# Patient Record
Sex: FEMALE | URBAN - METROPOLITAN AREA
[De-identification: names, ages, dates, MRNs, and addresses within clinical notes are randomized per-mention and may not be internally consistent; named-entity substitution may affect disease eponyms.]

---

## 2024-08-20 ENCOUNTER — LAB REQUISITION (OUTPATIENT)
Dept: LAB | Facility: HOSPITAL | Age: 24
End: 2024-08-20

## 2024-08-20 PROCEDURE — 86481 TB AG RESPONSE T-CELL SUSP: CPT

## 2024-08-22 ENCOUNTER — LAB (OUTPATIENT)
Dept: LAB | Facility: LAB | Age: 24
End: 2024-08-22

## 2024-08-22 DIAGNOSIS — Z11.1 ENCOUNTER FOR SCREENING FOR RESPIRATORY TUBERCULOSIS: Primary | ICD-10-CM

## 2024-08-22 PROCEDURE — 86481 TB AG RESPONSE T-CELL SUSP: CPT

## 2024-08-22 PROCEDURE — 36415 COLL VENOUS BLD VENIPUNCTURE: CPT

## 2024-08-24 LAB
NIL(NEG) CONTROL SPOT COUNT: NORMAL
PANEL A SPOT COUNT: 0
PANEL B SPOT COUNT: 0
POS CONTROL SPOT COUNT: NORMAL
T-SPOT. TB INTERPRETATION: NEGATIVE

## 2024-09-29 ENCOUNTER — ANCILLARY PROCEDURE (OUTPATIENT)
Dept: URGENT CARE | Age: 24
End: 2024-09-29
Payer: COMMERCIAL

## 2024-09-29 ENCOUNTER — OFFICE VISIT (OUTPATIENT)
Dept: URGENT CARE | Age: 24
End: 2024-09-29
Payer: COMMERCIAL

## 2024-09-29 VITALS
WEIGHT: 145 LBS | SYSTOLIC BLOOD PRESSURE: 120 MMHG | HEART RATE: 94 BPM | DIASTOLIC BLOOD PRESSURE: 83 MMHG | TEMPERATURE: 98.2 F | OXYGEN SATURATION: 99 %

## 2024-09-29 DIAGNOSIS — S49.91XA RIGHT SHOULDER INJURY, INITIAL ENCOUNTER: Primary | ICD-10-CM

## 2024-09-29 DIAGNOSIS — S49.91XA RIGHT SHOULDER INJURY, INITIAL ENCOUNTER: ICD-10-CM

## 2024-09-29 PROCEDURE — 73030 X-RAY EXAM OF SHOULDER: CPT | Mod: RIGHT SIDE | Performed by: PHYSICIAN ASSISTANT

## 2024-09-29 PROCEDURE — 1036F TOBACCO NON-USER: CPT | Performed by: PHYSICIAN ASSISTANT

## 2024-09-29 PROCEDURE — 99203 OFFICE O/P NEW LOW 30 MIN: CPT | Performed by: PHYSICIAN ASSISTANT

## 2024-09-29 RX ORDER — METHOCARBAMOL 500 MG/1
TABLET, FILM COATED ORAL
Qty: 16 TABLET | Refills: 0 | Status: SHIPPED | OUTPATIENT
Start: 2024-09-29

## 2024-09-29 NOTE — PROGRESS NOTES
Subjective   Patient ID: Adry Angel is a 24 y.o. female. They present today with a chief complaint of Shoulder Pain (Right shoulder popped out of place last night Tingly sensation in hand ).    History of Present Illness  HPI  Patient presents to the urgent care is a 24-year-old female with resolved right shoulder pain that began last night when she fully extended her shoulder. Patient states she stretched her right arm up against a cabinet while she squatted down to pick something up.  Patient states she felt a sharp pain, like her shoulder rotated forward out of place.  Patient states she was unable to change her position at that time, required her to support her right arm with her other arm.  Patient states as her  was driving her to the ER, her shoulder went back into place, and her pain stopped.  Patient reports while it felt out of place she had tingling in her fingers 3 through 5.  Patient denies any tingling, or changes in sensation in her right arm at this time.  Patient reports a history of possible shoulder subluxation in 2020, patient was seen at an urgent care, no treatment was required.  Patient states since the injury occurred she has never been able to fully extend her shoulder due to fear of it moving out of place again.    Past Medical History  Allergies as of 09/29/2024    (No Known Allergies)       (Not in a hospital admission)       No past medical history on file.    No past surgical history on file.     reports that she has never smoked. She has never used smokeless tobacco. She reports that she does not drink alcohol and does not use drugs.    Review of Systems  Review of Systems     Patient denies numbness, weakness, sore throat, fever, rash, dizziness, shortness of breath, increased urinary frequency, chills, abdominal pain, chest pain.                          Objective    Vitals:    09/29/24 1056   BP: 120/83   Pulse: 94   Temp: 36.8 °C (98.2 °F)   SpO2: 99%   Weight: 65.8 kg  "(145 lb)     Patient's last menstrual period was 08/29/2024.    Physical Exam  Appearance: Alert, oriented, cooperative, in no acute distress. Well nourished & well hydrated.    Skin: No petechiae or purpura.     Eyes: Conjunctiva pink with no redness or exudates. Eyelids without lesions. No scleral icterus.     ENT: Hearing grossly intact. External inspection of ears without lesions or erythema. no nasal flaring.    Pulmonary:  Good chest wall excursion. No accessory muscle use or stridor.    Cardiac:  No JVD.     Musculoskeletal: no deformity. No cyanosis, clubbing. sensation 5/5 in UE b/l, cap. fill < 2 sec  in UE b/l, FROM neck, declined shoulder ROM, strength 5/5 of UE, equal hand , no erythema or edema, no tenderness to palpation of musculature of mid posterior shoulder, no palpable spasms, no biceps tenderness, mild acromioclavicular joint and anterior shoulder tenderness to firm palpation. no spinal or paraspinal tenderness, neurovascularly intact.     Neurological: no focal findings identified.    Psychiatric: Appropriate mood and affect.  Procedures    Point of Care Test & Imaging Results from this visit  No results found for this visit on 09/29/24.   XR shoulder right 2+ views    Result Date: 9/29/2024  Interpreted By:  Monty Engle, STUDY: XR SHOULDER RIGHT 2+ VIEWS; ;  9/29/2024 12:24 pm   INDICATION: Signs/Symptoms:injury, \"feels like it rolled forward out of place\".   ,S49.91XA Unspecified injury of right shoulder and upper arm, initial encounter   COMPARISON: None.   ACCESSION NUMBER(S): [id number]   ORDERING CLINICIAN: JU WARD   FINDINGS: No evidence for acute fracture or dislocation. No bone lesions or cortical erosions. No radiopaque foreign bodies.       No plain film evidence for displaced acute fracture or dislocation.     MACRO: None   Signed by: Monty Engle 9/29/2024 12:40 PM Dictation workstation:   [id number]     Diagnostic study results (if any) were reviewed by Ju JAIMES" MARIE Abrams.    Assessment/Plan   Allergies, medications, history, and pertinent labs/EKGs/Imaging reviewed by Shaunna Abrams PA-C.     Medical Decision Making  Considerations for patient's symptoms include cervical/shoulder strain, disc herniation, impingement syndrome, osteoarthritis, adhesive capsulitis, cervical spondylosis, rotator cuff tendinitis, subluxation, glenohumeral instability or subacromial pain syndrome. Patient denies any symptoms at this time, but declined ROM exam.   Preliminary review of x-ray no acute fractures or dislocations  I discussed radiologist interpretation with patient.  Patient will be given referral for orthopedics. Patient is asymptomatic at this time, if symptoms return patient will take muscle relaxant methocarbamol and reach out to her orthopedic physician and/or primary care.  Patient advised to follow-up with orthopedics and or primary care if no improvement. Patient verbalizes understanding and agrees with plan of care.  All questions were answered.     Dictation software was used in the creation of this note which does not evaluate or correct for typographical, spelling, syntax or grammatical errors.     Orders and Diagnoses  Diagnoses and all orders for this visit:  Right shoulder injury, initial encounter  -     XR shoulder right 2+ views; Future  -     Referral to Orthopaedic Surgery; Future  -     methocarbamol (Robaxin) 500 mg tablet; Take 1 tab PO 4 times daily as needed for right shoulder pain.      Medical Admin Record      Patient disposition: Home    Electronically signed by Shaunna Abrams PA-C  12:51 PM

## 2024-09-29 NOTE — PATIENT INSTRUCTIONS
Home Care:   1. Rest the injured area and keep it elevated (above heart level) as much as possible. In bed, rest your arm or leg on pillows to keep it elevated.   2. Using cool packs on the first day might help to reduce swelling.   3. You might give acetaminophen (paracetamol, Tylenol) for pain.   4. Some recommend giving ibuprofen (Motrin, Advil), but this can cause increased bruising and bleeding in an injury.   5. If you have an elastic bandage, splint, or sling, adjust your bandage, splint, or sling if it becomes too tight or if it causes swelling.   6. Follow up with Orthopedics for further evaluation or earlier if symptoms are worse.     Call your doctor or go to the emergency department if worse or:   1. Pain increases.   2. Numbness or tingling occurs.   3. Swelling increases.   4. Nail or skin color (pale, bluish, or deeper in color than the other side) changes.   5. No improvement in 1-2 weeks

## 2024-10-04 ENCOUNTER — OFFICE VISIT (OUTPATIENT)
Dept: ORTHOPEDIC SURGERY | Facility: HOSPITAL | Age: 24
End: 2024-10-04
Payer: COMMERCIAL

## 2024-10-04 DIAGNOSIS — S49.91XA RIGHT SHOULDER INJURY, INITIAL ENCOUNTER: ICD-10-CM

## 2024-10-04 DIAGNOSIS — S43.431A GLENOID LABRUM TEAR, RIGHT, INITIAL ENCOUNTER: ICD-10-CM

## 2024-10-04 DIAGNOSIS — S43.431A ANTERIOR TO POSTERIOR TEAR OF SUPERIOR GLENOID LABRUM OF RIGHT SHOULDER: ICD-10-CM

## 2024-10-04 DIAGNOSIS — S43.014A CLOSED TRAUMATIC ANTERIOR DISLOCATION OF RIGHT GLENOHUMERAL JOINT: ICD-10-CM

## 2024-10-04 PROCEDURE — 99214 OFFICE O/P EST MOD 30 MIN: CPT | Performed by: STUDENT IN AN ORGANIZED HEALTH CARE EDUCATION/TRAINING PROGRAM

## 2024-10-04 PROCEDURE — 1036F TOBACCO NON-USER: CPT | Performed by: STUDENT IN AN ORGANIZED HEALTH CARE EDUCATION/TRAINING PROGRAM

## 2024-10-04 PROCEDURE — 99204 OFFICE O/P NEW MOD 45 MIN: CPT | Performed by: STUDENT IN AN ORGANIZED HEALTH CARE EDUCATION/TRAINING PROGRAM

## 2024-10-04 NOTE — PROGRESS NOTES
PRIMARY CARE PHYSICIAN: No primary care provider on file.  REFERRING PROVIDER: Shaunna Abrams PA-C  [address]  [facility], [address] [place] [id number]     CONSULT ORTHOPAEDIC: Shoulder Evaluation    ASSESSMENT & PLAN    Impression: 24 y.o. female with right shoulder anterior instability concerning for anterior-inferior glenoid labral tear.    Plan:   I explained to the patient the nature of their diagnosis.  I reviewed their imaging studies with them.    Based on the history, physical exam and imaging studies above, the patient's presentation is consistent with the above diagnosis.  I had a long discussion with the patient regarding their presentation and the treatment options.  We discussed initial nonoperative versus operative management options as well as potential further diagnostic imaging.  Diagnostic and treatment options going forward.  She had an anterior dislocation event with residual pain and dysfunction of the shoulder since.  I recommend that we proceed with an MRI of the right shoulder to evaluate for soft tissue injury that may require surgical intervention prior to proceeding with any further nonoperative management.    Follow-Up: Patient will follow-up after the MRI is completed to review the imaging studies and discuss a treatment plan going forward    At the end of the visit, all questions were answered in full. The patient is in agreement with the plan and recommendations. They will call the office with any questions/concerns.    Note dictated with NUV[employer] software. Completed without full typed error editing and sent to avoid delay.       SUBJECTIVE  CHIEF COMPLAINT:   Chief Complaint   Patient presents with    Right Shoulder - Pain        HPI: Adry Angel is a 24 y.o. patient who presents today with Right shoulder pain and dysfunction after an acute anterior instability event. Patient reports that she has a chronic history of right shoulder  pain and instability. Patient reports that her shoulder recently dislocated as she reached above her head.  After waiting a period of time, she reports that her shoulder reduced on its own; she reports that this was fairly traumatic I reviewed the patient's x-ray findings with her.  We discussed her. Pain is described as achy. She reports history of clicking in the anterior shoulder. She reports a prior history of shoulder subluxation. Patient is not taking any medication to help with pain.     They deny any associated neck pain.  No numbness, tingling, or paresthesias.    REVIEW OF SYSTEMS  Constitutional: See HPI for pain assessment, No significant weight loss, recent trauma  Cardiovascular: No chest pain, shortness of breath  Respiratory: No difficulty breathing, cough  Gastrointestinal: No nausea, vomiting, diarrhea, constipation  Musculoskeletal: Noted in HPI, positive for pain, restricted motion, stiffness  Integumentary: No rashes, easy bruising, redness   Neurological: no numbness or tingling in extremities, no gait disturbances   Psychiatric: No mood changes, memory changes, social issues  Heme/Lymph: no excessive swelling, easy bruising, excessive bleeding  ENT: No hearing changes  Eyes: No vision changes    No past medical history on file.     No Known Allergies     No past surgical history on file.     No family history on file.     Social History     Socioeconomic History    Marital status:      Spouse name: Not on file    Number of children: Not on file    Years of education: Not on file    Highest education level: Not on file   Occupational History    Not on file   Tobacco Use    Smoking status: Never    Smokeless tobacco: Never   Substance and Sexual Activity    Alcohol use: Never    Drug use: Never    Sexual activity: Not on file   Other Topics Concern    Not on file   Social History Narrative    Not on file     Social Determinants of Health     Financial Resource Strain: Not on file   Food  Insecurity: Not on file   Transportation Needs: Not on file   Physical Activity: Not on file   Stress: Not on file   Social Connections: Not on file   Intimate Partner Violence: Not on file   Housing Stability: Not on file        CURRENT MEDICATIONS:   Current Outpatient Medications   Medication Sig Dispense Refill    methocarbamol (Robaxin) 500 mg tablet Take 1 tab PO 4 times daily as needed for right shoulder pain. 16 tablet 0     No current facility-administered medications for this visit.        OBJECTIVE    PHYSICAL EXAM      9/29/2024    10:56 AM   Vitals   Systolic 120   Diastolic 83   Heart Rate 94   Temp 36.8 °C (98.2 °F)   Weight (lb) 145   Visit Report Report      There is no height or weight on file to calculate BMI.    GENERAL: A/Ox3, NAD. Appears healthy, well nourished  PSYCHIATRIC: Mood stable, appropriate memory recall  EYES: EOM intact, no scleral icterus  CARDIOVASCULAR: Palpable peripheral pulses  LUNGS: Breathing non-labored on room air  SKIN: no erythema, rashes, or ecchymosis     MUSCULOSKELETAL:  Laterality: right Shoulder Exam  - Negative Spurlings, full painless neck ROM  - Skin intact  - No erythema or warmth  - No ecchymosis or soft tissue swelling  - Shoulder ROM: Forward flexion 140 with apprehension, ER 30, IR mid lumbar  - Strength:      Jobes 5-/5     ER 5-/5     Belly press and bearhug 5-/5  - Palpation: Positive tenderness biceps groove and anterior joint line  - Ugarte and Neers positive  - Speeds and O'Briens positive  - Stability: Anterior/Posterior load and shift 2+ anterior 0 posterior, positive apprehension and relocation test    NEUROVASCULAR:  - Neurovascular Status: sensation intact to light touch distally, upper extremity motor grossly intact  - Capillary refill brisk at extremities, Bilateral peripheral pulses 2+    Imaging: Multiple views of the affected right shoulder(s) demonstrate: No significant osseous normality, no fracture, no significant degenerative change.    X-rays were personally reviewed and interpreted by me.  Radiology reports were reviewed by me as well, if readily available at the time.      Les Brown MD  Attending Surgeon    Sports Medicine Orthopaedic Surgery  [facility]

## 2024-10-21 ENCOUNTER — APPOINTMENT (OUTPATIENT)
Dept: RADIOLOGY | Facility: HOSPITAL | Age: 24
End: 2024-10-21
Payer: COMMERCIAL

## 2024-10-25 ENCOUNTER — TELEPHONE (OUTPATIENT)
Dept: ORTHOPEDIC SURGERY | Facility: HOSPITAL | Age: 24
End: 2024-10-25
Payer: COMMERCIAL

## 2024-11-01 ENCOUNTER — APPOINTMENT (OUTPATIENT)
Dept: ORTHOPEDIC SURGERY | Facility: HOSPITAL | Age: 24
End: 2024-11-01
Payer: COMMERCIAL

## 2024-11-01 ENCOUNTER — TELEPHONE (OUTPATIENT)
Dept: ORTHOPEDIC SURGERY | Facility: CLINIC | Age: 24
End: 2024-11-01
Payer: COMMERCIAL

## 2024-11-01 NOTE — TELEPHONE ENCOUNTER
Beto decided to pay out of pocket for the MRI. She going to have it performed at [facility] in [place]. She's going to notify our office once its completed to be scheduled for the results.

## 2024-11-05 DIAGNOSIS — S43.431A GLENOID LABRUM TEAR, RIGHT, INITIAL ENCOUNTER: ICD-10-CM

## 2024-11-05 DIAGNOSIS — S43.431A ANTERIOR TO POSTERIOR TEAR OF SUPERIOR GLENOID LABRUM OF RIGHT SHOULDER: ICD-10-CM

## 2024-11-15 ENCOUNTER — OFFICE VISIT (OUTPATIENT)
Dept: ORTHOPEDIC SURGERY | Facility: HOSPITAL | Age: 24
End: 2024-11-15
Payer: COMMERCIAL

## 2024-11-15 ENCOUNTER — TELEPHONE (OUTPATIENT)
Dept: ORTHOPEDIC SURGERY | Facility: HOSPITAL | Age: 24
End: 2024-11-15

## 2024-11-15 VITALS — WEIGHT: 150 LBS | BODY MASS INDEX: 24.99 KG/M2 | HEIGHT: 65 IN

## 2024-11-15 DIAGNOSIS — M25.311 INSTABILITY OF RIGHT SHOULDER JOINT: Primary | ICD-10-CM

## 2024-11-15 DIAGNOSIS — M65.911 SYNOVITIS OF RIGHT SHOULDER: ICD-10-CM

## 2024-11-15 DIAGNOSIS — S43.431A GLENOID LABRUM TEAR, RIGHT, INITIAL ENCOUNTER: ICD-10-CM

## 2024-11-15 PROCEDURE — 99214 OFFICE O/P EST MOD 30 MIN: CPT | Performed by: STUDENT IN AN ORGANIZED HEALTH CARE EDUCATION/TRAINING PROGRAM

## 2024-11-15 PROCEDURE — 3008F BODY MASS INDEX DOCD: CPT | Performed by: STUDENT IN AN ORGANIZED HEALTH CARE EDUCATION/TRAINING PROGRAM

## 2024-11-15 PROCEDURE — 1036F TOBACCO NON-USER: CPT | Performed by: STUDENT IN AN ORGANIZED HEALTH CARE EDUCATION/TRAINING PROGRAM

## 2024-11-15 RX ORDER — ESCITALOPRAM OXALATE 10 MG/1
1 TABLET ORAL
COMMUNITY
Start: 2024-09-10

## 2024-11-15 RX ORDER — ETONOGESTREL AND ETHINYL ESTRADIOL .12; .015 MG/D; MG/D
RING VAGINAL
COMMUNITY
Start: 2024-10-18

## 2024-11-15 ASSESSMENT — PAIN - FUNCTIONAL ASSESSMENT: PAIN_FUNCTIONAL_ASSESSMENT: 0-10

## 2024-11-15 ASSESSMENT — PAIN SCALES - GENERAL: PAINLEVEL_OUTOF10: 4

## 2024-11-15 NOTE — PROGRESS NOTES
PRIMARY CARE PHYSICIAN: No primary care provider on file.  REFERRING PROVIDER: No referring provider defined for this encounter.     CONSULT ORTHOPAEDIC: Shoulder Evaluation    ASSESSMENT & PLAN    Impression: 24 y.o. female with right shoulder anterior instability with anterior-inferior glenoid labral tear.    Plan:   I explained to the patient the nature of their diagnosis.  I reviewed their imaging studies with them.    Based on the history, physical exam and imaging studies above, the patient's presentation is consistent with the above diagnosis.  I had a long discussion with the patient regarding their presentation and the treatment options.  We discussed initial nonoperative versus operative management options as well as potential further diagnostic imaging.  I reviewed her MRI findings with her.  She had an anterior dislocation event with residual pain and dysfunction of the shoulder since.  MRI demonstrates findings consistent with an anterior dislocation of the glenohumeral joint with an anterior-inferior glenoid labrum tear, no significant glenoid bone loss, Hill-Sachs bony contusion with an intact rotator cuff.  I reviewed these MRI findings with her.  She continues to have a sensation of instability and apprehension despite initial nonoperative management.  After long discussion with the patient, she elected to proceed with surgical intervention in the form of a right shoulder arthroscopy, anterior stabilization, labral repair, capsulorrhaphy, intra-articular debridement and synovectomy.  I thoroughly explained the risks and benefits as well as the expected postoperative timeline for the proposed procedure versus nonoperative management. Risks of this procedure include but are not limited to bleeding, infection, nerve injury, DVT and failure of repair or implant.  The patient expressed understanding and wished to proceed with surgical intervention.  All questions were answered. We will begin the  presurgical process and find them a surgical date in a timely fashion that works for them.    The patient will require an abduction sling for postoperative joint stability. Additionally, in order to decrease the amount of narcotic pain medication usage and improve their pain control and swelling, I recommend a cryotherapy machine.    At the end of the visit, all questions were answered in full. The patient is in agreement with the plan and recommendations. They will call the office with any questions/concerns.    Note dictated with [employer] software. Completed without full typed error editing and sent to avoid delay.       SUBJECTIVE  CHIEF COMPLAINT: Right shoulder anterior shoulder dislocation/instability    HPI: Adry Angel is a 24 y.o. patient who presents today with Right shoulder pain and dysfunction after an acute anterior instability event. She continues to experience right shoulder instability with significant apprehension. No new injury to the shoulder since her last visit. MRI completed at [facility].     She is here today to review MRI results.     See below for last office visit note:    Patient reports that she has a chronic history of right shoulder pain and instability. Patient reports that her shoulder recently dislocated as she reached above her head.  After waiting a period of time, she reports that her shoulder reduced on its own; she reports that this was fairly traumatic I reviewed the patient's x-ray findings with her.  We discussed her. Pain is described as achy. She reports history of clicking in the anterior shoulder. She reports a prior history of shoulder subluxation. Patient is not taking any medication to help with pain.     They deny any associated neck pain.  No numbness, tingling, or paresthesias.    REVIEW OF SYSTEMS  Constitutional: See HPI for pain assessment, No significant weight loss, recent trauma  Cardiovascular: No chest pain, shortness of  breath  Respiratory: No difficulty breathing, cough  Gastrointestinal: No nausea, vomiting, diarrhea, constipation  Musculoskeletal: Noted in HPI, positive for pain, restricted motion, stiffness  Integumentary: No rashes, easy bruising, redness   Neurological: no numbness or tingling in extremities, no gait disturbances   Psychiatric: No mood changes, memory changes, social issues  Heme/Lymph: no excessive swelling, easy bruising, excessive bleeding  ENT: No hearing changes  Eyes: No vision changes    No past medical history on file.     No Known Allergies     No past surgical history on file.     No family history on file.     Social History     Socioeconomic History    Marital status:      Spouse name: Not on file    Number of children: Not on file    Years of education: Not on file    Highest education level: Not on file   Occupational History    Not on file   Tobacco Use    Smoking status: Never    Smokeless tobacco: Never   Substance and Sexual Activity    Alcohol use: Never    Drug use: Never    Sexual activity: Not on file   Other Topics Concern    Not on file   Social History Narrative    Not on file     Social Drivers of Health     Financial Resource Strain: Not on file   Food Insecurity: Not on file   Transportation Needs: Not on file   Physical Activity: Not on file   Stress: Not on file   Social Connections: Not on file   Intimate Partner Violence: Not on file   Housing Stability: Not on file        CURRENT MEDICATIONS:   Current Outpatient Medications   Medication Sig Dispense Refill    methocarbamol (Robaxin) 500 mg tablet Take 1 tab PO 4 times daily as needed for right shoulder pain. 16 tablet 0     No current facility-administered medications for this visit.        OBJECTIVE    PHYSICAL EXAM      9/29/2024    10:56 AM   Vitals   Systolic 120   Diastolic 83   Heart Rate 94   Temp 36.8 °C (98.2 °F)   Weight (lb) 145   Visit Report Report      There is no height or weight on file to calculate  BMI.    GENERAL: A/Ox3, NAD. Appears healthy, well nourished  PSYCHIATRIC: Mood stable, appropriate memory recall  EYES: EOM intact, no scleral icterus  CARDIOVASCULAR: Palpable peripheral pulses  LUNGS: Breathing non-labored on room air  SKIN: no erythema, rashes, or ecchymosis     MUSCULOSKELETAL:  Laterality: right Shoulder Exam  - Negative Spurlings, full painless neck ROM  - Skin intact  - No erythema or warmth  - No ecchymosis or soft tissue swelling  - Shoulder ROM: Forward flexion 140 with apprehension, ER 30, IR mid lumbar  - Strength:      Jobes 5-/5     ER 5-/5     Belly press and bearhug 5-/5  - Palpation: Positive tenderness biceps groove and anterior joint line  - Ugarte and Neers positive  - Speeds and O'Briens positive  - Stability: Anterior/Posterior load and shift 2+ anterior 0 posterior, positive apprehension and relocation test    NEUROVASCULAR:  - Neurovascular Status: sensation intact to light touch distally, upper extremity motor grossly intact  - Capillary refill brisk at extremities, Bilateral peripheral pulses 2+    Imaging: MRI of the right shoulder reviewed by me demonstrates findings consistent with an anterior dislocation event with anterior-inferior glenoid labrum tear, no evidence of glenoid bone loss, Hill-Sachs bony contusion, intact rotator cuff, glenohumeral synovitis.  Images were personally reviewed and interpreted by me.  Radiology reports were reviewed by me as well, if readily available at the time.      Les Brown MD  Attending Surgeon    Sports Medicine Orthopaedic Surgery  [facility] [place]  [facility]

## 2024-11-15 NOTE — H&P (VIEW-ONLY)
PRIMARY CARE PHYSICIAN: No primary care provider on file.  REFERRING PROVIDER: No referring provider defined for this encounter.     CONSULT ORTHOPAEDIC: Shoulder Evaluation    ASSESSMENT & PLAN    Impression: 24 y.o. female with right shoulder anterior instability with anterior-inferior glenoid labral tear.    Plan:   I explained to the patient the nature of their diagnosis.  I reviewed their imaging studies with them.    Based on the history, physical exam and imaging studies above, the patient's presentation is consistent with the above diagnosis.  I had a long discussion with the patient regarding their presentation and the treatment options.  We discussed initial nonoperative versus operative management options as well as potential further diagnostic imaging.  I reviewed her MRI findings with her.  She had an anterior dislocation event with residual pain and dysfunction of the shoulder since.  MRI demonstrates findings consistent with an anterior dislocation of the glenohumeral joint with an anterior-inferior glenoid labrum tear, no significant glenoid bone loss, Hill-Sachs bony contusion with an intact rotator cuff.  I reviewed these MRI findings with her.  She continues to have a sensation of instability and apprehension despite initial nonoperative management.  After long discussion with the patient, she elected to proceed with surgical intervention in the form of a right shoulder arthroscopy, anterior stabilization, labral repair, capsulorrhaphy, intra-articular debridement and synovectomy.  I thoroughly explained the risks and benefits as well as the expected postoperative timeline for the proposed procedure versus nonoperative management. Risks of this procedure include but are not limited to bleeding, infection, nerve injury, DVT and failure of repair or implant.  The patient expressed understanding and wished to proceed with surgical intervention.  All questions were answered. We will begin the  presurgical process and find them a surgical date in a timely fashion that works for them.    The patient will require an abduction sling for postoperative joint stability. Additionally, in order to decrease the amount of narcotic pain medication usage and improve their pain control and swelling, I recommend a cryotherapy machine.    At the end of the visit, all questions were answered in full. The patient is in agreement with the plan and recommendations. They will call the office with any questions/concerns.    Note dictated with gBox software. Completed without full typed error editing and sent to avoid delay.       SUBJECTIVE  CHIEF COMPLAINT: Right shoulder anterior shoulder dislocation/instability    HPI: Adry Angel is a 24 y.o. patient who presents today with Right shoulder pain and dysfunction after an acute anterior instability event. She continues to experience right shoulder instability with significant apprehension. No new injury to the shoulder since her last visit. MRI completed at [facility].     She is here today to review MRI results.     See below for last office visit note:    Patient reports that she has a chronic history of right shoulder pain and instability. Patient reports that her shoulder recently dislocated as she reached above her head.  After waiting a period of time, she reports that her shoulder reduced on its own; she reports that this was fairly traumatic I reviewed the patient's x-ray findings with her.  We discussed her. Pain is described as achy. She reports history of clicking in the anterior shoulder. She reports a prior history of shoulder subluxation. Patient is not taking any medication to help with pain.     They deny any associated neck pain.  No numbness, tingling, or paresthesias.    REVIEW OF SYSTEMS  Constitutional: See HPI for pain assessment, No significant weight loss, recent trauma  Cardiovascular: No chest pain, shortness of  breath  Respiratory: No difficulty breathing, cough  Gastrointestinal: No nausea, vomiting, diarrhea, constipation  Musculoskeletal: Noted in HPI, positive for pain, restricted motion, stiffness  Integumentary: No rashes, easy bruising, redness   Neurological: no numbness or tingling in extremities, no gait disturbances   Psychiatric: No mood changes, memory changes, social issues  Heme/Lymph: no excessive swelling, easy bruising, excessive bleeding  ENT: No hearing changes  Eyes: No vision changes    No past medical history on file.     No Known Allergies     No past surgical history on file.     No family history on file.     Social History     Socioeconomic History    Marital status:      Spouse name: Not on file    Number of children: Not on file    Years of education: Not on file    Highest education level: Not on file   Occupational History    Not on file   Tobacco Use    Smoking status: Never    Smokeless tobacco: Never   Substance and Sexual Activity    Alcohol use: Never    Drug use: Never    Sexual activity: Not on file   Other Topics Concern    Not on file   Social History Narrative    Not on file     Social Drivers of Health     Financial Resource Strain: Not on file   Food Insecurity: Not on file   Transportation Needs: Not on file   Physical Activity: Not on file   Stress: Not on file   Social Connections: Not on file   Intimate Partner Violence: Not on file   Housing Stability: Not on file        CURRENT MEDICATIONS:   Current Outpatient Medications   Medication Sig Dispense Refill    methocarbamol (Robaxin) 500 mg tablet Take 1 tab PO 4 times daily as needed for right shoulder pain. 16 tablet 0     No current facility-administered medications for this visit.        OBJECTIVE    PHYSICAL EXAM      9/29/2024    10:56 AM   Vitals   Systolic 120   Diastolic 83   Heart Rate 94   Temp 36.8 °C (98.2 °F)   Weight (lb) 145   Visit Report Report      There is no height or weight on file to calculate  BMI.    GENERAL: A/Ox3, NAD. Appears healthy, well nourished  PSYCHIATRIC: Mood stable, appropriate memory recall  EYES: EOM intact, no scleral icterus  CARDIOVASCULAR: Palpable peripheral pulses  LUNGS: Breathing non-labored on room air  SKIN: no erythema, rashes, or ecchymosis     MUSCULOSKELETAL:  Laterality: right Shoulder Exam  - Negative Spurlings, full painless neck ROM  - Skin intact  - No erythema or warmth  - No ecchymosis or soft tissue swelling  - Shoulder ROM: Forward flexion 140 with apprehension, ER 30, IR mid lumbar  - Strength:      Jobes 5-/5     ER 5-/5     Belly press and bearhug 5-/5  - Palpation: Positive tenderness biceps groove and anterior joint line  - Ugarte and Neers positive  - Speeds and O'Briens positive  - Stability: Anterior/Posterior load and shift 2+ anterior 0 posterior, positive apprehension and relocation test    NEUROVASCULAR:  - Neurovascular Status: sensation intact to light touch distally, upper extremity motor grossly intact  - Capillary refill brisk at extremities, Bilateral peripheral pulses 2+    Imaging: MRI of the right shoulder reviewed by me demonstrates findings consistent with an anterior dislocation event with anterior-inferior glenoid labrum tear, no evidence of glenoid bone loss, Hill-Sachs bony contusion, intact rotator cuff, glenohumeral synovitis.  Images were personally reviewed and interpreted by me.  Radiology reports were reviewed by me as well, if readily available at the time.      Les Brown MD  Attending Surgeon    Sports Medicine Orthopaedic Surgery  [facility] [place]  [facility] [employer]

## 2024-11-15 NOTE — TELEPHONE ENCOUNTER
[address] [facility] [address] [phone] Patient: Kendall Roach MRN: [id number] EWC:[id number] Visit Information Date & Time Provider Department Dept. Phone Encounter #  
 12/11/2017  9:45 AM Quiana Harkins MD [facility] [phone] [id number] Follow-up Instructions Return in about 6 months (around 6/11/2018). Upcoming Health Maintenance Date Due Hepatitis C Screening 1950 FOOT EXAM Q1 3/20/1962 DTaP/Tdap/Td series (1 - Tdap) 3/20/1973 BREAST CANCER SCRN MAMMOGRAM 3/20/2002 FOBT Q 1 YEAR AGE 50-75 3/20/2002 ZOSTER VACCINE AGE 60> 1/20/2012 OSTEOPOROSIS SCREENING (DEXA) 3/20/2017 Pneumococcal 65+ Low/Medium Risk (1 of 2 - PCV13) 3/20/2017 MEDICARE YEARLY EXAM 3/20/2017 Influenza Age 5 to Adult 8/1/2017 EYE EXAM RETINAL OR DILATED Q1 12/5/2017 HEMOGLOBIN A1C Q6M 6/4/2018 MICROALBUMIN Q1 12/4/2018 LIPID PANEL Q1 12/4/2018 GLAUCOMA SCREENING Q2Y 12/5/2018 Allergies as of 12/11/2017  Review Complete On: 12/11/2017 By: Quiana Harkins MD  
 No Known Allergies Current Immunizations  Never Reviewed No immunizations on file. Not reviewed this visit You Were Diagnosed With   
  
 Codes Comments Uncontrolled type 2 diabetes mellitus with stage 2 chronic kidney disease, without long-term current use of insulin (HCC)    -  Primary ICD-10-CM: E11.22, E11.65, N18.2 ICD-9-CM: 250.52, 585.2 Mixed hyperlipidemia     ICD-10-CM: E78.2 ICD-9-CM: 272.2 Essential hypertension     ICD-10-CM: I10 
ICD-9-CM: 401.9 Vitals BP Pulse Temp Resp Height(growth percentile) Weight(growth percentile) 151/78 (BP 1 Location: Left arm, BP Patient Position: Sitting) 82 96.2 °F (35.7 °C) (Oral) 16 5' 1\" (1.549 m) 216 lb 6.4 oz (98.2 kg) SpO2 BMI OB Status Smoking Status 98% 40.89 kg/m2 Unknown Never Smoker Please submit case request for R jose on 11/27/24   BMI and BSA Data Body Mass Index Body Surface Area  
 40.89 kg/m 2 2.06 m 2 Preferred Pharmacy Pharmacy Name Phone [facility], [address] Jennifer Lopez [phone] Your Updated Medication List  
  
   
This list is accurate as of: 12/11/17 10:31 AM.  Always use your most recent med list. amLODIPine 10 mg tablet Commonly known as:  Emily Pace TAKE 1 TABLET BY MOUTH DAILY  
  
 aspirin 81 mg tablet Take 81 mg by mouth. * atorvastatin 40 mg tablet Commonly known as:  LIPITOR Take 1 Tab by mouth nightly. Stop 20 mg dose * atorvastatin 20 mg tablet Commonly known as:  LIPITOR  
TAKE 1 TABLET BY MOUTH EVERY NIGHT  
  
 COREG CR 40 mg CR capsule Generic drug:  carvedilol TAKE 1 CAPSULE BY MOUTH EVERY DAY WITH BREAKFAST  
  
 glucose blood VI test strips strip Commonly known as:  ONE TOUCH TEST  
100 Each by Does Not Apply route two (2) times a day. Dx. Code 250.02  
  
 loratadine 10 mg tablet Commonly known as:  Faith Ashley Take 10 mg by mouth.  
  
 metFORMIN 500 mg tablet Commonly known as:  GLUCOPHAGE  
TAKE 1 TABLET BY MOUTH TWICE DAILY WITH MEAL  
  
 sAXagliptin 5 mg Tab tablet Commonly known as:  ONGLYZA Take 1 Tab by mouth daily. Stop tradjenta * Notice: This list has 2 medication(s) that are the same as other medications prescribed for you. Read the directions carefully, and ask your doctor or other care provider to review them with you. Prescriptions Printed Refills sAXagliptin (ONGLYZA) 5 mg tab tablet 6 Sig: Take 1 Tab by mouth daily. Stop tradjenta Class: Print Route: Oral  
  
Follow-up Instructions Return in about 6 months (around 6/11/2018). Patient Instructions Increase to lipitor  40 mg at night STOP tradjenta 5 mg a day Start on onglyza 5 mg a day Metformin 500 mg twice a day with meals STOP norvasc 10 mg a day , and  STOP  coreg cr 40 mg  
Start on  Diltiazem  mg  once a day Introducing [facility]! Dear Tyrese De La Torre: Thank you for requesting a [employer] account. Our records indicate that you already have an active [employer] account. You can access your account anytime at [email or web address] Did you know that you can access your hospital and ER discharge instructions at any time in [employer]? You can also review all of your test results from your hospital stay or ER visit. Additional Information If you have questions, please visit the Frequently Asked Questions section of the [employer] website at [email or web address] Remember, [employer] is NOT to be used for urgent needs. For medical emergencies, dial 911. Now available from your iPhone and Android! Please provide this summary of care documentation to your next provider. Your primary care clinician is listed as Harry Levy. If you have any questions after today's visit, please call [phone].

## 2024-11-17 PROBLEM — M25.311 INSTABILITY OF RIGHT SHOULDER JOINT: Status: ACTIVE | Noted: 2024-11-15

## 2024-11-17 PROBLEM — S43.431A GLENOID LABRUM TEAR, RIGHT, INITIAL ENCOUNTER: Status: ACTIVE | Noted: 2024-11-15

## 2024-11-17 PROBLEM — M65.911 SYNOVITIS OF RIGHT SHOULDER: Status: ACTIVE | Noted: 2024-11-15

## 2024-11-18 ENCOUNTER — CLINICAL SUPPORT (OUTPATIENT)
Dept: PREADMISSION TESTING | Facility: HOSPITAL | Age: 24
End: 2024-11-18
Payer: COMMERCIAL

## 2024-11-18 VITALS — HEIGHT: 65 IN | BODY MASS INDEX: 24.99 KG/M2 | WEIGHT: 150 LBS

## 2024-11-18 DIAGNOSIS — S43.431A GLENOID LABRUM TEAR, RIGHT, INITIAL ENCOUNTER: ICD-10-CM

## 2024-11-18 ASSESSMENT — ENCOUNTER SYMPTOMS
FEVER: 0
CHILLS: 0
WEAKNESS: 0
COUGH: 0
WOUND: 0
VOMITING: 0
BRUISES/BLEEDS EASILY: 0
NAUSEA: 0
DYSPNEA AT REST: 0
TREMORS: 0
SHORTNESS OF BREATH: 0

## 2024-11-18 NOTE — PREPROCEDURE INSTRUCTIONS
Current Medications   Medication Instructions    EluRyng 0.12-0.015 mg/24 hr vaginal ring As directed    escitalopram (Lexapro) 10 mg tablet Take evening prior to surgery                       NPO Instructions:    Do not eat any food after midnight the night before your surgery/procedure.    Additional Instructions:     Seven/Six Days before Surgery:  Review your medication instructions, stop indicated medications  Five Days before Surgery:  Review your medication instructions, stop indicated medications  Three Days before Surgery:  Review your medication instructions, stop indicated medications  The Day before Surgery:  Review your medication instructions, stop indicated medications  You will be contacted regarding the time of your arrival to facility and surgery time  Do not eat any food after Midnight  Day of Surgery:  Review your medication instructions, take indicated medications  Wear  comfortable loose fitting clothing  Do not use moisturizers, creams, lotions or perfume  All jewelry and valuables should be left at home

## 2024-11-18 NOTE — CPM/PAT NURSE NOTE
CPM/PAT Nurse Note      Name: Adry Angel (Adry Angel)  /Age:  y.o.       Past Medical History:   Diagnosis Date    Anxiety     Depression     Urinary tract infection        Past Surgical History:   Procedure Laterality Date    NO PAST SURGERIES         Patient  has no history on file for sexual activity.    No family history on file.    No Known Allergies    Prior to Admission medications    Medication Sig Start Date End Date Taking? Authorizing Provider   EluRyng 0.12-0.015 mg/24 hr vaginal ring PLEASE SEE ATTACHED FOR DETAILED DIRECTIONS 10/18/24  Yes Historical Provider, MD   escitalopram (Lexapro) 10 mg tablet Take 1 tablet (10 mg) by mouth early in the morning.. 9/10/24  Yes Historical Provider, MD   methocarbamol (Robaxin) 500 mg tablet Take 1 tab PO 4 times daily as needed for right shoulder pain. 24   MARIE Espinoza ROS:   Constitutional:    no fever   no chills  Neuro/Psych:    no weakness   no tremors  Eyes:   Ears:    no hearing aides  Nose:   Mouth:   Throat:    no throat pain  Neck:   Cardio:    no chest pain   no dyspnea  Respiratory:    no cough   no shortness of breath  Endocrine:   GI:    no nausea   no vomiting  :   Musculoskeletal:   Hematologic:    does not bruise/bleed easily   no history of blood transfusion  Skin:   no sores/wound   no rash      DASI Risk Score      Flowsheet Row Questionnaire Series Submission from 2024 in [facility] Care with Generic Provider Dodie   Can you take care of yourself (eat, dress, bathe, or use toilet)?  2.75  filed at 2024 2319   Can you walk indoors, such as around your house? 1.75  filed at 2024 2319   Can you walk a block or two on level ground?  2.75  filed at 2024 2319   Can you climb a flight of stairs or walk up a hill? 5.5  filed at [address]   Can you run a short distance? 8  filed at [address]   Can you do light work around the house like dusting or washing dishes? 2.7   filed at 11/17/2024 2319   Can you do moderate work around the house like vacuuming, sweeping floors or carrying groceries? 3.5  filed at 11/17/2024 2319   Can you do heavy work around the house like scrubbing floors or lifting and moving heavy furniture?  8  filed at 11/17/2024 2319   Can you do yard work like raking leaves, weeding or pushing a mower? 4.5  filed at 11/17/2024 2319   Can you have sexual relations? 5.25  filed at 11/17/2024 2319   Can you participate in moderate recreational activities like golf, bowling, dancing, doubles tennis or throwing a baseball or football? 6  filed at 11/17/2024 2319   Can you participate in strenous sports like swimming, singles tennis, football, basketball, or skiing? 7.5  filed at 11/17/2024 2319   DASI SCORE 58.2  filed at 11/17/2024 2319   METS Score (Will be calculated only when all the questions are answered) 9.9  filed at 11/17/2024 2319          Caprini DVT Assessment    No data to display       Modified Frailty Index    No data to display       CHADS2 Stroke Risk  Current as of about an hour ago        N/A 3 to 100%: High Risk   2 to < 3%: Medium Risk   0 to < 2%: Low Risk     Last Change: N/A          This score determines the patient's risk of having a stroke if the patient has atrial fibrillation.        This score is not applicable to this patient. Components are not calculated.          Revised Cardiac Risk Index    No data to display       Apfel Simplified Score    No data to display       Risk Analysis Index Results This Encounter    No data found in the last 10 encounters.       Stop Bang Score      Flowsheet Row Clinical Support from 11/18/2024 in [facility] Questionnaire Series Submission from 11/17/2024 in [facility] Care with Generic Provider Dodie   Do you snore loudly? 0 filed at 11/18/2024 1457 0  filed at 11/17/2024 2319   Do you often feel tired or fatigued after your sleep? 0 filed at 11/18/2024 1457 0  filed at 11/17/2024 2319    Has anyone ever observed you stop breathing in your sleep? 0 filed at 11/18/2024 1457 0  filed at 11/17/2024 2319   Do you have or are you being treated for high blood pressure? 0 filed at 11/18/2024 1457 0  filed at 11/17/2024 2319   Recent BMI (Calculated) 25 filed at 11/18/2024 1457 25  filed at 11/17/2024 2319   Is BMI greater than 35 kg/m2? 0=No filed at 11/18/2024 1457 0=No  filed at 11/17/2024 2319   Age older than 50 years old? 0=No filed at 11/18/2024 1457 0=No  filed at 11/17/2024 2319   Gender - Male 0=No filed at 11/18/2024 1457 0=No  filed at 11/17/2024 2319          Prodigy: High Risk  Total Score: 0          ARISCAT Score for Postoperative Pulmonary Complications    No data to display       Saeed Perioperative Risk for Myocardial Infarction or Cardiac Arrest (JOSE)    No data to display         Nurse Plan of Action: Spoke with patient and completed RN PAT screening call. Discussed preoperative surgical instructions and education. Chart updated per information provided by patient. Patient had no further questions at this time. In person PAT appointment not requested for patient per patient history.

## 2024-11-21 ENCOUNTER — APPOINTMENT (OUTPATIENT)
Dept: PREADMISSION TESTING | Facility: HOSPITAL | Age: 24
End: 2024-11-21
Payer: COMMERCIAL

## 2024-11-22 NOTE — DISCHARGE INSTRUCTIONS
Les Brown M.D.   Sports Medicine Orthopaedic Surgery    [facility]  [address] [facility]                  [address] [facility]       [address]  [facility], [address]   Phone: [phone]         Phone: [phone]       Phone: [phone]   Fax: [phone]                         Fax: [phone]       Fax: [phone]     After hours phone number: [phone]    AFTER SURGERY     Anesthesia  If you received a nerve block during surgery, you may have numbness or inability to move the limb. Do not be alarmed as this may last 8-36 hours depending upon the amount and type of medication used by the anesthesiologist. Make sure If you are experiencing numbness after 36 hours, please call the office. When the nerve block begins to wear off, you will feel a tingling sensation, like pins and needles. It is important that you start taking the pain medication at that time to ensure that you “stay ahead of the pain.” It is important to take the pain medicine when the pain level is a 4 or 5/10, before it gets too high.    Do not operate heavy machinery, make major decisions, drink alcohol or smoke for 24 hours. Do not drink alcohol while taking pain medications.    Prescribed Medications   Narcotic pain medicine (Oxycodone): The goal of post-operative pain management is pain control, NOT pain elimination. You should expect some pain after surgery - this pain helps you protect itself while it is healing. Constipation, nausea, itching, and drowsiness are side effects of this type of medication. You should take an over-the-counter stool softener (Colace and/or Senna) while taking narcotics to prevent constipation. If you experience itching, over the counter Benadryl may be helpful. Narcotic pain medications often produce drowsiness and it is against the law to operate a vehicle  while taking these medications. If you are taking oxycodone, you should take acetaminophen (Tylenol) around the clock to decrease baseline pain. Do not take Tylenol-containing products while on Percocet or [place].   Refill Policy: For concerns over your safety due to the rising opioid addiction epidemic in the [place], refills of your narcotic pain medications will only be provided on a case by case basis. Please use these medications judiciously.    Anti-inflammatory (NSAID) medicine (Naproxen or Mobic): These are both anti-inflammatory and pain relief. Do NOT take this medication if you have had an ulcer in the past unless you have cleared this with your primary care doctor. You should take NSAIDs with food or antacid to reduce the chance of upset stomach. Depending on your surgery, Dr. Brown may instruct you to avoid these medications.    Anti-nausea medicine (ondansetron/Zofran): sometimes patients experience nausea related to either anesthesia or the narcotic pain medication. If this is the case you will find this medication helpful.    DVT prophylaxis (Aspirin or Eliquis): For most patients, activity alone is sufficient to prevent dangerous blood clots, but in some cases your personal risk profile and/or the type of surgery you have undergone makes it necessary that you take medication to help prevent blood clots. Dr. Brown will inform you if you are to start one of these medications postoperatively.    Stool softener (Colace and/or Senna): are available over the counter at your local pharmacy and should be taken while you are taking narcotic pain medication to avoid constipation. You should stop taking these medications if you develop diarrhea. Over the counter laxatives may be used if you develop painful constipation.     Diet   Start with clear liquids (water, juice, Gatorade) and light foods (jello, soup, crackers). Progress to normal diet as tolerated if you are not nauseated. Avoid greasy or spicy  foods for the first 24hrs to avoid GI upset. Increase fluid intake to help prevent constipation.    Dressings / Wound Care  You may remove the outer dressing after 3 days and then can shower. (If you have a splint, please leave the splint in place until follow-up.) Do not remove Steri-strips (white stickers) if present over your incisions. Steri-strips may fall off on their own, which is normal. After the bandage has been removed, you may leave the incisions open to air. Alternatively, if you prefer to keep them covered, you may do so with Band-Aids, a light gauze dressing, or a clean ACE wrap. You may shower after the bandage has been removed (3 days), but it is very important that you pat the wounds dry after the shower. It is OK to allow soap and water to run over the wound - DO NOT soak or scrub the wound. Outside of the shower, keep your incision clean and dry until your first postoperative visit, approximately 10-14 days after surgery. You may remove your sling or brace to shower, unless otherwise instructed. As your balance may be affected by recent surgery, we recommend placing a plastic chair in the shower to help prevent falls. Do NOT take baths, go into a pool, or soak the operative site until approved by Dr. Brown.    Bracing / Physical Therapy   If you were given one, make sure you wear sling or brace at all times until your follow-up with Dr. Brown. Only remove your sling or brace for physical therapy, home exercises, and hygiene. These are typically used for 4-6 weeks after surgery in order to protect the healing of the tissue.     Physical therapy is just as important to your recovery as the actual operation! If you were given a prescription for physical therapy, make sure you go to your appointments and do your exercises daily at home (especially motion exercises).     Ice is a very important part of your recovery. It helps reduce inflammation and improves pain control. You should ice a few times  each day for 20-30 minutes at a time. Please make sure there is something under the ice (clean towel, cloth, T-shirt) so that the ice doesn't directly contact your skin. If you ordered a commercially available ice machine (optional) and a compression setting is available, you should use LOW or no compression during the first 5 days. After that, you may increase compression setting as tolerated. If the compression is bothering you then do not use compression.    Driving / Travel  Ultimately, it is your judgment to decide when you are safe to drive, but if you are at all unsure, do not risk your life or someone else's. As a general guideline, you will not be able to drive for 4-6 weeks after surgery. You should certainly not drive while on narcotics pain medication or while in a brace or sling.     Avoid flights and long distance traveling for 6 weeks after surgery. It is important to discuss your travel plans with Dr. Brown, as additional medications may need to be prescribed to help prevent blood clots if certain travel is unavoidable.     Return to Work or School   Your return to work will depend on what surgery was done and what type of work you do. Please note that these are general guidelines, and there may be modifications based on your unique situation. Typically, you may return to sedentary work or school 3-7 days after surgery if pain is tolerable and you are no longer requiring narcotic pain medication. In conjunction with your input, Dr. Brown will determine when you may return to more physically rigorous demands.     If you had Knee or Hip Surgery   If your surgery involves a ligament reconstruction or tendon repair, you will typically be prescribed crutches for at least the first few days until pain and the postoperative protocol allows you to fully bear weight and also wear a brace for 4-6 weeks. If cartilage surgery or meniscus repair is performed, you may be on crutches for 6 weeks. Individual  rehabilitation guidelines will vary based upon the unique situation and surgery of every patient, but take these general guidelines into account when planning return to work.     If you had Shoulder Surgery   If your surgery involves a repair (rotator cuff repair, labral repair), you will have a sling on for six weeks after surgery. If you have a sling, you will need to wear it all day. Please wear the sling at all times except for bathing for the first 2 weeks minimum. As long as you can abide by the restrictions, you can return to work when you feel like you can do so safely. However, you will need to take into consideration driving and activities related to your job. You may be able to safely loosen it if you are able to keep your arm supported. Please understand that you will NOT be able to work with your arm away from your body, above shoulder level, or use your arm against gravity for approximately 8 weeks. For jobs that require physical labor, you may require four months or more to return to work. If your surgery does NOT involve a repair (subacromial decompression, distal clavicle excision, capsular release), then you will be in a sling for only a few days after surgery. When comfortable, you may return to work when ready to conduct normal activities of your job. Remember that you may be on narcotic pain medications and these should be discontinued prior to your return to work. For jobs that require physical labor, you may require 6 weeks or more to return to work.     If you had Elbow or Wrist Surgery   If your surgery involves a repair or reconstruction, you will have a splint and sling on followed by a brace for four to six weeks after surgery. As long as you can abide by the restrictions, you can return to work when you feel like you can do so safely. However, you will need to take into consideration driving and activities related to your job. If you have a sling, you will need to wear it all day unless  otherwise instructed. You may be able to safely loosen it if you are able to keep your arm supported. For jobs that require physical labor, you may require four months or more to return to work.    If you had Ankle Surgery   If your surgery involves a repair or reconstruction, you will have a splint followed by a walking boot for four to six weeks after surgery. As long as you can abide by the restrictions, you can return to work when you feel like you can do so safely. However, you will need to take into consideration driving and activities related to your job. For jobs that require physical labor, you may require four months or more to return to work.    Normal Sensations and Findings after Surgery:   PAIN: We do everything possible to make your pain/discomfort level tolerable, but some amount of pain is to be expected.   WARMTH: Mild warmth around the operative site is normal for up to 3 weeks.   REDNESS: Small amount of redness where the sutures enter the skin is normal. If redness worsens or spreads it is important that you contact the office.   DRAINAGE: A small amount is normal for the first 48-72 hours. If wounds continue to drain after this time (requiring multiple gauze changes per day), please contact the office.   NUMBNESS: Around the incision is common.   BRUISING: Is common and often tracks down the arm or leg due to gravity and results in an alarming appearance, but is common and will resolve with time.   FEVER: Low-grade fevers (less than 101.5°F) are common during the first week after surgery. You should drink plenty of fluids and breathe deeply.   CONSTIPATION: Post-operative pain medications as well as immobility can lead to constipation. Please consider taking an over the counter stool softener such as Colace and/or Senna if you experience constipation or if you have a history of constipation.    Follow-Up   A Follow-up appointment should be arranged for 10-14 days after surgery. If one has not  been provided, please call the office to schedule.       NOTIFY US IMMEDIATELY FOR ANY OF THE FOLLOWING:   Most orthopedic surgical procedures are uneventful. However, complications can occur. The following are things to be aware of in the immediate postoperative period.   FEVER - Temperature rises above 101.5°F or associated chills/sweats   WOUND - If you notice drainage more than 4 days after surgery, if the drainage turns yellows and foul smelling, if you need to change gauze multiple times per day, or if sutures become loose.   CARDIOVASCULAR - Chest pain, shortness of breath, palpitations, or fainting spells must be taken seriously. Go to the emergency room (or call 911) immediately for evaluation.   BLOOD CLOTS - Orthopaedic surgery patients are at risk for blood clots. While the risk is higher for lower extremity surgery, even those who have undergone upper extremity surgery are at an increased risk. Please notify Dr. Brown if you or someone in your family has had blood clots or any type of known clotting disorder. Signs of blood clots may include calf pain or cramping, diffuse swelling in the leg and foot, or chest pain and shortness of breath. Please call the office or go to the hospital if you recognize any of these symptoms.   NAUSEA - If you have severe vomiting, diarrhea, or constipation, or cannot keep any liquid down   URINARY RETENTION - If you cannot urinate the night after surgery, please go to the Emergency Room.

## 2024-11-27 ENCOUNTER — ANESTHESIA EVENT (OUTPATIENT)
Dept: OPERATING ROOM | Facility: HOSPITAL | Age: 24
End: 2024-11-27
Payer: COMMERCIAL

## 2024-11-27 ENCOUNTER — ANESTHESIA (OUTPATIENT)
Dept: OPERATING ROOM | Facility: HOSPITAL | Age: 24
End: 2024-11-27
Payer: COMMERCIAL

## 2024-11-27 ENCOUNTER — HOSPITAL ENCOUNTER (OUTPATIENT)
Facility: HOSPITAL | Age: 24
Setting detail: OUTPATIENT SURGERY
Discharge: HOME | End: 2024-11-27
Attending: STUDENT IN AN ORGANIZED HEALTH CARE EDUCATION/TRAINING PROGRAM | Admitting: STUDENT IN AN ORGANIZED HEALTH CARE EDUCATION/TRAINING PROGRAM
Payer: COMMERCIAL

## 2024-11-27 VITALS
HEIGHT: 65 IN | TEMPERATURE: 98.1 F | HEART RATE: 91 BPM | OXYGEN SATURATION: 94 % | BODY MASS INDEX: 26.08 KG/M2 | DIASTOLIC BLOOD PRESSURE: 85 MMHG | WEIGHT: 156.53 LBS | RESPIRATION RATE: 16 BRPM | SYSTOLIC BLOOD PRESSURE: 139 MMHG

## 2024-11-27 DIAGNOSIS — S43.431A GLENOID LABRUM TEAR, RIGHT, INITIAL ENCOUNTER: ICD-10-CM

## 2024-11-27 DIAGNOSIS — M25.311 INSTABILITY OF RIGHT SHOULDER JOINT: ICD-10-CM

## 2024-11-27 DIAGNOSIS — M65.911 SYNOVITIS OF RIGHT SHOULDER: Primary | ICD-10-CM

## 2024-11-27 PROBLEM — F41.9 ANXIETY: Status: ACTIVE | Noted: 2024-11-27

## 2024-11-27 PROBLEM — F32.A DEPRESSION: Status: ACTIVE | Noted: 2024-11-27

## 2024-11-27 LAB — HCG UR QL IA.RAPID: NEGATIVE

## 2024-11-27 PROCEDURE — 2720000007 HC OR 272 NO HCPCS: Performed by: STUDENT IN AN ORGANIZED HEALTH CARE EDUCATION/TRAINING PROGRAM

## 2024-11-27 PROCEDURE — 29806 SHO ARTHRS SRG CAPSULORRAPHY: CPT | Performed by: STUDENT IN AN ORGANIZED HEALTH CARE EDUCATION/TRAINING PROGRAM

## 2024-11-27 PROCEDURE — 2500000004 HC RX 250 GENERAL PHARMACY W/ HCPCS (ALT 636 FOR OP/ED)

## 2024-11-27 PROCEDURE — 2780000003 HC OR 278 NO HCPCS: Performed by: STUDENT IN AN ORGANIZED HEALTH CARE EDUCATION/TRAINING PROGRAM

## 2024-11-27 PROCEDURE — 7100000009 HC PHASE TWO TIME - INITIAL BASE CHARGE: Performed by: STUDENT IN AN ORGANIZED HEALTH CARE EDUCATION/TRAINING PROGRAM

## 2024-11-27 PROCEDURE — 2500000004 HC RX 250 GENERAL PHARMACY W/ HCPCS (ALT 636 FOR OP/ED): Performed by: NURSE ANESTHETIST, CERTIFIED REGISTERED

## 2024-11-27 PROCEDURE — 3600000009 HC OR TIME - EACH INCREMENTAL 1 MINUTE - PROCEDURE LEVEL FOUR: Performed by: STUDENT IN AN ORGANIZED HEALTH CARE EDUCATION/TRAINING PROGRAM

## 2024-11-27 PROCEDURE — 81025 URINE PREGNANCY TEST: CPT

## 2024-11-27 PROCEDURE — 2500000001 HC RX 250 WO HCPCS SELF ADMINISTERED DRUGS (ALT 637 FOR MEDICARE OP)

## 2024-11-27 PROCEDURE — 2500000005 HC RX 250 GENERAL PHARMACY W/O HCPCS

## 2024-11-27 PROCEDURE — 3600000004 HC OR TIME - INITIAL BASE CHARGE - PROCEDURE LEVEL FOUR: Performed by: STUDENT IN AN ORGANIZED HEALTH CARE EDUCATION/TRAINING PROGRAM

## 2024-11-27 PROCEDURE — 7100000002 HC RECOVERY ROOM TIME - EACH INCREMENTAL 1 MINUTE: Performed by: STUDENT IN AN ORGANIZED HEALTH CARE EDUCATION/TRAINING PROGRAM

## 2024-11-27 PROCEDURE — 2500000004 HC RX 250 GENERAL PHARMACY W/ HCPCS (ALT 636 FOR OP/ED): Mod: JZ

## 2024-11-27 PROCEDURE — 7100000010 HC PHASE TWO TIME - EACH INCREMENTAL 1 MINUTE: Performed by: STUDENT IN AN ORGANIZED HEALTH CARE EDUCATION/TRAINING PROGRAM

## 2024-11-27 PROCEDURE — 29823 SHO ARTHRS SRG XTNSV DBRDMT: CPT

## 2024-11-27 PROCEDURE — 29823 SHO ARTHRS SRG XTNSV DBRDMT: CPT | Performed by: STUDENT IN AN ORGANIZED HEALTH CARE EDUCATION/TRAINING PROGRAM

## 2024-11-27 PROCEDURE — 3700000001 HC GENERAL ANESTHESIA TIME - INITIAL BASE CHARGE: Performed by: STUDENT IN AN ORGANIZED HEALTH CARE EDUCATION/TRAINING PROGRAM

## 2024-11-27 PROCEDURE — 7100000001 HC RECOVERY ROOM TIME - INITIAL BASE CHARGE: Performed by: STUDENT IN AN ORGANIZED HEALTH CARE EDUCATION/TRAINING PROGRAM

## 2024-11-27 PROCEDURE — 2500000004 HC RX 250 GENERAL PHARMACY W/ HCPCS (ALT 636 FOR OP/ED): Performed by: ANESTHESIOLOGY

## 2024-11-27 PROCEDURE — C1713 ANCHOR/SCREW BN/BN,TIS/BN: HCPCS | Performed by: STUDENT IN AN ORGANIZED HEALTH CARE EDUCATION/TRAINING PROGRAM

## 2024-11-27 PROCEDURE — 2500000004 HC RX 250 GENERAL PHARMACY W/ HCPCS (ALT 636 FOR OP/ED): Performed by: STUDENT IN AN ORGANIZED HEALTH CARE EDUCATION/TRAINING PROGRAM

## 2024-11-27 PROCEDURE — 2500000005 HC RX 250 GENERAL PHARMACY W/O HCPCS: Performed by: STUDENT IN AN ORGANIZED HEALTH CARE EDUCATION/TRAINING PROGRAM

## 2024-11-27 PROCEDURE — 3700000002 HC GENERAL ANESTHESIA TIME - EACH INCREMENTAL 1 MINUTE: Performed by: STUDENT IN AN ORGANIZED HEALTH CARE EDUCATION/TRAINING PROGRAM

## 2024-11-27 PROCEDURE — A4649 SURGICAL SUPPLIES: HCPCS | Performed by: STUDENT IN AN ORGANIZED HEALTH CARE EDUCATION/TRAINING PROGRAM

## 2024-11-27 DEVICE — IMPLANTABLE DEVICE: Type: IMPLANTABLE DEVICE | Site: SHOULDER | Status: FUNCTIONAL

## 2024-11-27 RX ORDER — ONDANSETRON HYDROCHLORIDE 2 MG/ML
INJECTION, SOLUTION INTRAVENOUS AS NEEDED
Status: DISCONTINUED | OUTPATIENT
Start: 2024-11-27 | End: 2024-11-27

## 2024-11-27 RX ORDER — CELECOXIB 200 MG/1
200 CAPSULE ORAL ONCE
Status: COMPLETED | OUTPATIENT
Start: 2024-11-27 | End: 2024-11-27

## 2024-11-27 RX ORDER — FENTANYL CITRATE 50 UG/ML
25 INJECTION, SOLUTION INTRAMUSCULAR; INTRAVENOUS EVERY 5 MIN PRN
Status: DISCONTINUED | OUTPATIENT
Start: 2024-11-27 | End: 2024-11-27 | Stop reason: HOSPADM

## 2024-11-27 RX ORDER — OXYCODONE HYDROCHLORIDE 5 MG/1
5 TABLET ORAL EVERY 4 HOURS PRN
Qty: 15 TABLET | Refills: 0 | Status: SHIPPED | OUTPATIENT
Start: 2024-11-27 | End: 2024-11-30

## 2024-11-27 RX ORDER — LIDOCAINE HYDROCHLORIDE 10 MG/ML
INJECTION, SOLUTION EPIDURAL; INFILTRATION; INTRACAUDAL; PERINEURAL AS NEEDED
Status: DISCONTINUED | OUTPATIENT
Start: 2024-11-27 | End: 2024-11-27

## 2024-11-27 RX ORDER — ASPIRIN 81 MG/1
81 TABLET ORAL 2 TIMES DAILY
Qty: 30 TABLET | Refills: 0 | Status: SHIPPED | OUTPATIENT
Start: 2024-11-27 | End: 2024-12-12

## 2024-11-27 RX ORDER — ONDANSETRON HYDROCHLORIDE 2 MG/ML
4 INJECTION, SOLUTION INTRAVENOUS ONCE AS NEEDED
Status: DISCONTINUED | OUTPATIENT
Start: 2024-11-27 | End: 2024-11-27 | Stop reason: HOSPADM

## 2024-11-27 RX ORDER — ONDANSETRON 4 MG/1
4 TABLET, FILM COATED ORAL EVERY 8 HOURS PRN
Qty: 20 TABLET | Refills: 0 | Status: SHIPPED | OUTPATIENT
Start: 2024-11-27 | End: 2024-12-03 | Stop reason: ALTCHOICE

## 2024-11-27 RX ORDER — OXYCODONE HYDROCHLORIDE 5 MG/1
5 TABLET ORAL EVERY 4 HOURS PRN
Status: DISCONTINUED | OUTPATIENT
Start: 2024-11-27 | End: 2024-11-27 | Stop reason: HOSPADM

## 2024-11-27 RX ORDER — ACETAMINOPHEN 500 MG
1000 TABLET ORAL EVERY 8 HOURS PRN
Qty: 60 TABLET | Refills: 1 | Status: SHIPPED | OUTPATIENT
Start: 2024-11-27 | End: 2024-12-03 | Stop reason: ALTCHOICE

## 2024-11-27 RX ORDER — FENTANYL CITRATE 50 UG/ML
100 INJECTION, SOLUTION INTRAMUSCULAR; INTRAVENOUS ONCE
Status: COMPLETED | OUTPATIENT
Start: 2024-11-27 | End: 2024-11-27

## 2024-11-27 RX ORDER — PROPOFOL 10 MG/ML
INJECTION, EMULSION INTRAVENOUS AS NEEDED
Status: DISCONTINUED | OUTPATIENT
Start: 2024-11-27 | End: 2024-11-27

## 2024-11-27 RX ORDER — FENTANYL CITRATE 50 UG/ML
INJECTION, SOLUTION INTRAMUSCULAR; INTRAVENOUS AS NEEDED
Status: DISCONTINUED | OUTPATIENT
Start: 2024-11-27 | End: 2024-11-27

## 2024-11-27 RX ORDER — LIDOCAINE HYDROCHLORIDE 10 MG/ML
0.1 INJECTION, SOLUTION EPIDURAL; INFILTRATION; INTRACAUDAL; PERINEURAL ONCE
Status: DISCONTINUED | OUTPATIENT
Start: 2024-11-27 | End: 2024-11-27 | Stop reason: HOSPADM

## 2024-11-27 RX ORDER — MIDAZOLAM HYDROCHLORIDE 1 MG/ML
2 INJECTION, SOLUTION INTRAMUSCULAR; INTRAVENOUS ONCE
Status: COMPLETED | OUTPATIENT
Start: 2024-11-27 | End: 2024-11-27

## 2024-11-27 RX ORDER — CEFAZOLIN SODIUM 2 G/100ML
2 INJECTION, SOLUTION INTRAVENOUS ONCE
Status: COMPLETED | OUTPATIENT
Start: 2024-11-27 | End: 2024-11-27

## 2024-11-27 RX ORDER — MEPERIDINE HYDROCHLORIDE 25 MG/ML
12.5 INJECTION INTRAMUSCULAR; INTRAVENOUS; SUBCUTANEOUS EVERY 10 MIN PRN
Status: DISCONTINUED | OUTPATIENT
Start: 2024-11-27 | End: 2024-11-27 | Stop reason: HOSPADM

## 2024-11-27 RX ORDER — LABETALOL HYDROCHLORIDE 5 MG/ML
5 INJECTION, SOLUTION INTRAVENOUS ONCE AS NEEDED
Status: DISCONTINUED | OUTPATIENT
Start: 2024-11-27 | End: 2024-11-27 | Stop reason: HOSPADM

## 2024-11-27 RX ORDER — FENTANYL CITRATE 50 UG/ML
50 INJECTION, SOLUTION INTRAMUSCULAR; INTRAVENOUS EVERY 5 MIN PRN
Status: DISCONTINUED | OUTPATIENT
Start: 2024-11-27 | End: 2024-11-27 | Stop reason: HOSPADM

## 2024-11-27 RX ORDER — GABAPENTIN 300 MG/1
300 CAPSULE ORAL ONCE
Status: COMPLETED | OUTPATIENT
Start: 2024-11-27 | End: 2024-11-27

## 2024-11-27 RX ORDER — ACETAMINOPHEN 325 MG/1
975 TABLET ORAL ONCE
Status: COMPLETED | OUTPATIENT
Start: 2024-11-27 | End: 2024-11-27

## 2024-11-27 RX ORDER — ROCURONIUM BROMIDE 10 MG/ML
INJECTION, SOLUTION INTRAVENOUS AS NEEDED
Status: DISCONTINUED | OUTPATIENT
Start: 2024-11-27 | End: 2024-11-27

## 2024-11-27 ASSESSMENT — COLUMBIA-SUICIDE SEVERITY RATING SCALE - C-SSRS
6. HAVE YOU EVER DONE ANYTHING, STARTED TO DO ANYTHING, OR PREPARED TO DO ANYTHING TO END YOUR LIFE?: NO
1. IN THE PAST MONTH, HAVE YOU WISHED YOU WERE DEAD OR WISHED YOU COULD GO TO SLEEP AND NOT WAKE UP?: NO
2. HAVE YOU ACTUALLY HAD ANY THOUGHTS OF KILLING YOURSELF?: NO

## 2024-11-27 ASSESSMENT — PAIN DESCRIPTION - DESCRIPTORS
DESCRIPTORS: SORE

## 2024-11-27 ASSESSMENT — PAIN - FUNCTIONAL ASSESSMENT
PAIN_FUNCTIONAL_ASSESSMENT: WONG-BAKER FACES
PAIN_FUNCTIONAL_ASSESSMENT: WONG-BAKER FACES
PAIN_FUNCTIONAL_ASSESSMENT: 0-10
PAIN_FUNCTIONAL_ASSESSMENT: 0-10
PAIN_FUNCTIONAL_ASSESSMENT: WONG-BAKER FACES
PAIN_FUNCTIONAL_ASSESSMENT: 0-10
PAIN_FUNCTIONAL_ASSESSMENT: WONG-BAKER FACES

## 2024-11-27 ASSESSMENT — PAIN SCALES - GENERAL
PAINLEVEL_OUTOF10: 2
PAINLEVEL_OUTOF10: 0 - NO PAIN
PAINLEVEL_OUTOF10: 0 - NO PAIN
PAINLEVEL_OUTOF10: 2
PAINLEVEL_OUTOF10: 2
PAINLEVEL_OUTOF10: 0 - NO PAIN
PAINLEVEL_OUTOF10: 0 - NO PAIN
PAINLEVEL_OUTOF10: 2
PAINLEVEL_OUTOF10: 2

## 2024-11-27 NOTE — PERIOPERATIVE NURSING NOTE
0848: Patient admitted to pacu  0909: patient comfortable. Tolerating water with no n/v  0915: patient tolerating crackers with no n/v  0923: patient to phase II

## 2024-11-27 NOTE — BRIEF OP NOTE
Date: 2024  OR Location: [facility]    Name: Adry Angel, : 2000, Age: 24 y.o., MRN: [id number], Sex: female    Diagnosis  Pre-op Diagnosis      * Glenoid labrum tear, right, initial encounter [S43.431A]     * Instability of right shoulder joint [M25.311]     * Synovitis of right shoulder [M65.911] Post-op Diagnosis     * Glenoid labrum tear, right, initial encounter [S43.431A]     * Instability of right shoulder joint [M25.311]     * Synovitis of right shoulder [M65.911]     Procedures  1.  Right shoulder arthroscopy, anterior stabilization, labral repair and capsulorrhaphy  2.  Right shoulder arthroscopic extensive intra-articular debridement and synovectomy    Surgeons      * Les Brown - Primary    Resident/Fellow/Other Assistant:  Surgeons and Role:     * Gabrielle Lopresti, PA-C - [facility] Assist    Staff:   Ugoulator: Terri Wu Person: David    Anesthesia Staff: Anesthesiologist: Beto Bejarano MD  CRNA: EVERETTE Almeida-CRNA    Procedure Summary  Anesthesia: Regional, General  ASA: I  Estimated Blood Loss: 5mL  Intra-op Medications:   Administrations occurring from 0700 to 0850 on 24:   Medication Name Total Dose   EPINEPHrine (Adrenalin) 1 mg/mL 2 mg in sodium chloride 0.9 % 3,000 mL irrigation 2 mL   dexAMETHasone (Decadron) injection 4 mg/mL 8 mg   fentaNYL (Sublimaze) injection 50 mcg/mL 150 mcg   LR bolus Cannot be calculated   lidocaine PF (Xylocaine-MPF) local injection 1 % 5 mL   ondansetron (Zofran) 2 mg/mL injection 4 mg   propofol (Diprivan) injection 10 mg/mL 200 mg   rocuronium 10 mg/mL 60 mg   sugammadex (Bridion) 200 mg/2 mL injection 200 mg   ceFAZolin (Ancef) 2 g in dextrose (iso)  mL 2 g   fentaNYL PF (Sublimaze) injection 100 mcg 100 mcg   midazolam (Versed) injection 2 mg 2 mg          Anesthesia Record               Intraprocedure I/O Totals       None           Specimen: No specimens collected     Findings: Anterior-inferior glenoid labral  tear extending posteriorly, rotator cuff intact, glenohumeral synovitis, biceps anchor and biceps tendon intact    Complications:  None; patient tolerated the procedure well.     Disposition: PACU - hemodynamically stable.  Condition: stable  Specimens Collected: No specimens collected  Attending Attestation: I performed the procedure.    Les Brown  Phone Number: [phone]

## 2024-11-27 NOTE — ANESTHESIA POSTPROCEDURE EVALUATION
Patient: Adry Angel    Procedure Summary       Date: 11/27/24 Room / Location: [facility] OR 04 / Virtual [facility]    Anesthesia Start: 0719 Anesthesia Stop: 0850    Procedure: Right shoulder arthroscopy, anterior stabilization, labral repair, capsulorrhaphy, intra-articular debridement and synovectomy (Right: Shoulder) Diagnosis:       Glenoid labrum tear, right, initial encounter      Instability of right shoulder joint      Synovitis of right shoulder      (Glenoid labrum tear, right, initial encounter [S43.431A])      (Instability of right shoulder joint [M25.311])      (Synovitis of right shoulder [M65.911])    Surgeons: Les Brown MD Responsible Provider: Beto Bejarano MD    Anesthesia Type: general, regional ASA Status: 1            Anesthesia Type: general, regional    Vitals Value Taken Time   /89 11/27/24 0908   Temp 36.7 °C (98.1 °F) 11/27/24 0908   Pulse 104 11/27/24 0908   Resp 14 11/27/24 0908   SpO2 94 % 11/27/24 0908       Anesthesia Post Evaluation    Patient location during evaluation: PACU  Patient participation: complete - patient participated  Level of consciousness: awake  Pain management: adequate  Airway patency: patent  Cardiovascular status: acceptable  Respiratory status: acceptable  Hydration status: acceptable  Postoperative Nausea and Vomiting: none        There were no known notable events for this encounter.

## 2024-11-27 NOTE — OP NOTE
Right shoulder arthroscopy, anterior stabilization, labral repair, capsulorrhaphy, intra-articular debridement and synovectomy (R) Operative Note     Date: 2024  OR Location: [facility]    Name: Adry Anegl, : 2000, Age: 24 y.o., MRN: [id number], Sex: female    Diagnosis  Pre-op Diagnosis      * Glenoid labrum tear, right, initial encounter [S43.431A]     * Instability of right shoulder joint [M25.311]     * Synovitis of right shoulder [M65.911] Post-op Diagnosis     * Glenoid labrum tear, right, initial encounter [S43.431A]     * Instability of right shoulder joint [M25.311]     * Synovitis of right shoulder [M65.911]     Procedures  1.  Right shoulder arthroscopy, anterior stabilization, labral repair and capsulorrhaphy  2.  Right shoulder arthroscopic extensive intra-articular debridement and synovectomy    Surgeons      * Les Brown - Primary    Resident/Fellow/Other Assistant:  Surgeons and Role:     * Gabrielle Lopresti, PA-C - [facility] Ass[facility]    Staff:   Nancy: Terri Wu Person: David    Anesthesia Staff: Anesthesiologist: Beto Bejarano MD  CRNA: EVERETTE Almeida-CRNA    Procedure Summary  Anesthesia: Regional, General  ASA: I  Estimated Blood Loss: 5mL  Intra-op Medications:   Administrations occurring from 0700 to 0850 on 24:   Medication Name Total Dose   EPINEPHrine (Adrenalin) 1 mg/mL 2 mg in sodium chloride 0.9 % 3,000 mL irrigation 2 mL   dexAMETHasone (Decadron) injection 4 mg/mL 8 mg   fentaNYL (Sublimaze) injection 50 mcg/mL 150 mcg   LR bolus Cannot be calculated   lidocaine PF (Xylocaine-MPF) local injection 1 % 5 mL   ondansetron (Zofran) 2 mg/mL injection 4 mg   propofol (Diprivan) injection 10 mg/mL 200 mg   rocuronium 10 mg/mL 60 mg   sugammadex (Bridion) 200 mg/2 mL injection 200 mg   ceFAZolin (Ancef) 2 g in dextrose (iso)  mL 2 g   fentaNYL PF (Sublimaze) injection 100 mcg 100 mcg   midazolam (Versed) injection 2 mg 2 mg           Anesthesia Record               Intraprocedure I/O Totals       None           Specimen: No specimens collected      Drains and/or Catheters: * None in log *    Tourniquet Times:         Implants:  Implants       Type Name Action Serial No.      [place] Q-FIX KNOTLESS ALL-SUTURE ANCHOR 1.8MM WITH ONE MINITAPE SUTURE, BLUE- SM[employer] Implanted [id number]            Findings: Anterior-inferior glenoid labral tear extending posteriorly, rotator cuff intact, glenohumeral synovitis, biceps anchor and biceps tendon intact    Indications: Adry Angel is an 24 y.o. female who is having surgery for chronic right anterior shoulder instability.  She failed extensive nonoperative management.  MRI confirmed an anterior-inferior glenoid labral tear.  After long discussion with the patient, she elected to proceed with surgical intervention of form of a right shoulder arthroscopy, anterior stabilization, labral repair, capsulorrhaphy, intra-articular debridement and synovectomy.  I thoroughly explained the risks and benefits as well as the expected postoperative timeline for the proposed procedure versus nonoperative management. Risks of this procedure include but are not limited to bleeding, infection, nerve injury, DVT and failure of repair or implant.  The patient expressed understanding and wished to proceed with surgical intervention.  All questions were answered. They were consented to the above procedure at bedside.    The patient was seen in the preoperative area. The risks, benefits, complications, treatment options, non-operative alternatives, expected recovery and outcomes were discussed with the patient. The possibilities of reaction to medication, pulmonary aspiration, injury to surrounding structures, bleeding, recurrent infection, the need for additional procedures, failure to diagnose a condition, and creating a complication requiring transfusion or operation were discussed with the patient. The patient  concurred with the proposed plan, giving informed consent.  The site of surgery was properly noted/marked if necessary per policy. The patient has been actively warmed in preoperative area. Preoperative antibiotics have been ordered and given within 1 hours of incision. Venous thrombosis prophylaxis have been ordered including bilateral sequential compression devices    Procedure Details:   The patient was seen in the preoperative holding area where the correct site and side were signed my initials. The patient was seen by the anesthesia team and a preoperative regional anesthetic block was administered.  The patient was brought back to the operating room after smooth induction of LMA anesthesia the patient was placed in the lateral position with a beanbag.  All bony prominences were padded.  The patient was belted and posted.  SCD boots were placed on bilateral lower extremities. An axillary role was placed to protect the down axilla. The contralateral arm was placed on an arm martinez.  The operative arm was prepped and draped in usual sterile fashion and placed in the lateral traction arm martinez.  Prior to the start of the procedure, preoperative antibiotics were administered by the anesthesia team.  Prior to incision, a preoperative time-out was performed confirming the correct patient, side, site and procedure to be performed.  All in the room were in agreement.    Preoperative examination under anesthesia: Anterior load-and-shift 2+, posterior 1+, full symmetric motion    We began the procedure with the standard posterior portal to the shoulder.  Local anesthetic was administered at the posterior portal site and the posterior portal was established with an 11 blade through the skin.  The arthroscope was introduced atraumatically into the glenohumeral joint. Anterior and 7 o'clock portals established using an outside in technique with spinal needle.  The skin was incised with a #11 blade and a threaded cannula  was inserted atraumatically. Diagnostic arthroscopy was performed demonstrating intact chondral surfaces on the glenoid and the humeral head.  The labrum demonstrated tearing from the 3 to 930 o'clock position with a loose fragment of the anterior-inferior labrum.  The rotator cuff was intact without tear.  There was extensive synovitis throughout the glenohumeral joint.  The inferior pouch was clear of loose bodies.  The inferior sling and inferior glenohumeral ligament was intact.    An extensive intra-articular debridement and synovectomy was performed in the anterior superior and posterior aspects of the glenohumeral joint, glenoid, glenoid labrum, humeral head, rotator cuff, middle glenohumeral ligament and capsular tissue with a combination of arthroscopic shaver and ablator.    We then proceeded with the labral repair portion of the case.  A labral elevator was used to elevate the labrum and anterior inferior and posterior capsular tissue from the glenoid.  A gentle decortication was performed with an arthroscopic shaver.  The labrum was then repaired with knotless Q fix anchors x 6 to cover the entirety of the tear.  This was performed sequentially with the first anchor placed at the 7 o'clock position.  The anchors were drilled and inserted; suture lasso was utilized to capture the labrum and capsular tissue.  The capsulorrhaphy was performed of the anterior and inferior capsular tissue along with the labral repair.  Posteriorly, the labrum was repaired without overtightening of the capsular tissue.  Anchors were inserted at the 3, 4, 5, 6, 7 and 830 o'clock positions.  This improve the stability of the humeral joint with retensioning of the capsular tissue and glenohumeral ligaments as well as repair of the labrum to the glenoid.    Arthroscopic photos were taken throughout.  Excess arthroscopic fluid was drained from the shoulder and the arthroscopic instruments were removed.      The wounds were  thoroughly irrigated.  Portals were closed with buried interrupted 3-0 Monocryl sutures and dressed with Steri-Strips, dry gauze and Tegaderms.  The operative arm was placed in a shoulder sling with an abduction pillow with a cryotherapy pad placed on the operative shoulder.    At the end of the procedure all needle, lap and instrument counts were correct.    The patient was woken from anesthesia and transferred to the recovery room in stable condition.  The patient tolerated the procedure well.    I was present for all critical portions of this case.    Postoperative instructions:  The patient will remain in a shoulder sling with an abduction pillow at all times except for bathing.  The patient will return to see me in 2 weeks for a routine 2 week postoperative visit. They will begin PT according to the PT protocol appropriate for their surgery.    Gabrielle LoPresti, PA-C was present for the entire case.  Her assistance was necessary and critical to the successful completion of the procedure.  She provided skilled assistance with arm positioning, arthroscope manipulation, implant insertion, and wound closure.  A qualified assistant was not available to perform her portion of the case.    Complications:  None; patient tolerated the procedure well.    Disposition: PACU - hemodynamically stable.  Condition: stable     Attending Attestation: I performed the procedure.    Les Brown  Phone Number: [phone]

## 2024-11-27 NOTE — ANESTHESIA PROCEDURE NOTES
Peripheral Block    Patient location during procedure: pre-op  Start time: 11/27/2024 7:04 AM  End time: 11/27/2024 7:07 AM  Reason for block: at surgeon's request and post-op pain management  Staffing  Performed: attending   Authorized by: Beto Bejarano MD    Performed by: Beto Bejarano MD  Preanesthetic Checklist  Completed: patient identified, IV checked, site marked, risks and benefits discussed, surgical consent, monitors and equipment checked, pre-op evaluation and timeout performed   Timeout performed at: 11/27/2024 7:01 AM  Peripheral Block  Patient position: laying flat  Prep: ChloraPrep  Patient monitoring: heart rate, cardiac monitor and continuous pulse ox  Block type: interscalene  Laterality: right  Injection technique: single-shot  Guidance: ultrasound guided  Local infiltration: ropivacaine  Infiltration strength: 0.5 %  Dose: 20 mL  Needle  Needle type: short-bevel   Needle gauge: 22 G  Needle length: 5 cm  Needle localization: ultrasound guidance  Assessment  Injection assessment: negative aspiration for heme, no paresthesia on injection, incremental injection and local visualized surrounding nerve on ultrasound  Paresthesia pain: none  Heart rate change: no  Slow fractionated injection: yes

## 2024-11-27 NOTE — ANESTHESIA PROCEDURE NOTES
Airway  Date/Time: 11/27/2024 7:25 AM  Urgency: elective      Staffing  Performed: CRNA   Authorized by: Beto Bejarano MD    Performed by: EVERETTE Almeida-CRNA  Patient location during procedure: OR    Indications and Patient Condition  Indications for airway management: anesthesia and airway protection  Spontaneous Ventilation: absent  Sedation level: deep  Preoxygenated: yes  Patient position: sniffing  MILS maintained throughout  Mask difficulty assessment: 1 - vent by mask    Final Airway Details  Final airway type: endotracheal airway      Successful airway: ETT  Cuffed: yes   Successful intubation technique: direct laryngoscopy  Facilitating devices/methods: intubating stylet  Endotracheal tube insertion site: oral  Blade: Ti  Blade size: #3  ETT size (mm): 7.0  Cormack-Lehane Classification: grade I - full view of glottis  Placement verified by: chest auscultation and capnometry   Measured from: gums  ETT to gums (cm): 21  Number of attempts at approach: 1

## 2024-12-03 ENCOUNTER — APPOINTMENT (OUTPATIENT)
Dept: PRIMARY CARE | Facility: CLINIC | Age: 24
End: 2024-12-03
Payer: COMMERCIAL

## 2024-12-03 VITALS
BODY MASS INDEX: 25.79 KG/M2 | WEIGHT: 155 LBS | DIASTOLIC BLOOD PRESSURE: 77 MMHG | OXYGEN SATURATION: 98 % | SYSTOLIC BLOOD PRESSURE: 121 MMHG | HEART RATE: 92 BPM

## 2024-12-03 DIAGNOSIS — M65.911 SYNOVITIS OF RIGHT SHOULDER: ICD-10-CM

## 2024-12-03 DIAGNOSIS — F34.1 PERSISTENT DEPRESSIVE DISORDER: ICD-10-CM

## 2024-12-03 DIAGNOSIS — F41.8 DEPRESSION WITH ANXIETY: Primary | ICD-10-CM

## 2024-12-03 DIAGNOSIS — Z78.9 USES VAGINAL CONTRACEPTIVE RING: ICD-10-CM

## 2024-12-03 PROCEDURE — 99204 OFFICE O/P NEW MOD 45 MIN: CPT | Performed by: FAMILY MEDICINE

## 2024-12-03 RX ORDER — ESCITALOPRAM OXALATE 10 MG/1
10 TABLET ORAL
Qty: 90 TABLET | Refills: 1 | Status: SHIPPED | OUTPATIENT
Start: 2024-12-03

## 2024-12-03 RX ORDER — ETONOGESTREL AND ETHINYL ESTRADIOL VAGINAL RING .015; .12 MG/D; MG/D
RING VAGINAL
Qty: 3 EACH | Refills: 1 | Status: SHIPPED | OUTPATIENT
Start: 2024-12-03

## 2024-12-03 ASSESSMENT — PATIENT HEALTH QUESTIONNAIRE - PHQ9
2. FEELING DOWN, DEPRESSED OR HOPELESS: NOT AT ALL
1. LITTLE INTEREST OR PLEASURE IN DOING THINGS: NOT AT ALL
SUM OF ALL RESPONSES TO PHQ9 QUESTIONS 1 AND 2: 0

## 2024-12-03 ASSESSMENT — ENCOUNTER SYMPTOMS
OCCASIONAL FEELINGS OF UNSTEADINESS: 0
LOSS OF SENSATION IN FEET: 0
DEPRESSION: 0

## 2024-12-08 PROBLEM — F41.8 DEPRESSION WITH ANXIETY: Status: ACTIVE | Noted: 2024-12-08

## 2024-12-08 PROBLEM — Z78.9 USES VAGINAL CONTRACEPTIVE RING: Status: ACTIVE | Noted: 2024-12-08

## 2024-12-08 ASSESSMENT — ENCOUNTER SYMPTOMS
DYSPHORIC MOOD: 1
DECREASED CONCENTRATION: 1
HEMATOLOGIC/LYMPHATIC NEGATIVE: 1
MYALGIAS: 1
RESPIRATORY NEGATIVE: 1
VOMITING: 0
ENDOCRINE NEGATIVE: 1
NEUROLOGICAL NEGATIVE: 1
ARTHRALGIAS: 1
CHILLS: 0
ALLERGIC/IMMUNOLOGIC NEGATIVE: 1
CARDIOVASCULAR NEGATIVE: 1
NAUSEA: 0
JOINT SWELLING: 1
GASTROINTESTINAL NEGATIVE: 1
FEVER: 0
NERVOUS/ANXIOUS: 1

## 2024-12-09 NOTE — PROGRESS NOTES
Subjective   Patient ID: Adry Angel is a 24 y.o. female who presents for Follow-up (Meds refill) and New Patient Visit.      HPI 24-year-old female status post recent shoulder surgery presents today for refills has history of anxiety and depression has been on Lexapro has been stable on the medication for the last 3 years previous refills were done by her physician in [place] but is now living in [place] denies any dizziness headaches fatigue denies any suicidal homicidal ideations states her anxiety and depression are well-controlled  Needs a refill for her contraceptive ring has tolerated that well has had a Pap smear unsure when  Status post shoulder surgery taking aspirin twice a day denies nausea abdominal pain  Current Outpatient Medications on File Prior to Visit   Medication Sig Dispense Refill    aspirin 81 mg EC tablet Take 1 tablet (81 mg) by mouth 2 times a day for 15 days. 30 tablet 0    [DISCONTINUED] acetaminophen (Tylenol) 500 mg tablet Take 2 tablets (1,000 mg) by mouth every 8 hours if needed for mild pain (1 - 3) for up to 20 days. 60 tablet 1    [DISCONTINUED] EluRyng 0.12-0.015 mg/24 hr vaginal ring PLEASE SEE ATTACHED FOR DETAILED DIRECTIONS      [DISCONTINUED] escitalopram (Lexapro) 10 mg tablet Take 1 tablet (10 mg) by mouth early in the morning..      [DISCONTINUED] methocarbamol (Robaxin) 500 mg tablet Take 1 tab PO 4 times daily as needed for right shoulder pain. 16 tablet 0    [DISCONTINUED] ondansetron (Zofran) 4 mg tablet Take 1 tablet (4 mg) by mouth every 8 hours if needed for nausea or vomiting for up to 7 days. 20 tablet 0     No current facility-administered medications on file prior to visit.        Review of Systems   Constitutional:  Negative for chills and fever.   HENT: Negative.     Respiratory: Negative.     Cardiovascular: Negative.    Gastrointestinal: Negative.  Negative for nausea and vomiting.   Endocrine: Negative.    Genitourinary: Negative.    Musculoskeletal:   Positive for arthralgias, joint swelling and myalgias.   Skin: Negative.  Negative for rash.   Allergic/Immunologic: Negative.    Neurological: Negative.    Hematological: Negative.    Psychiatric/Behavioral:  Positive for decreased concentration and dysphoric mood. The patient is nervous/anxious.    All other systems reviewed and are negative.      Objective   /77   Pulse 92   Wt 70.3 kg (155 lb)   LMP 11/20/2024   SpO2 98%   BMI 25.79 kg/m²   BSA: 1.8 meters squared  Growth percentiles: Facility age limit for growth %oli is 20 years. Facility age limit for growth %oli is 20 years.   Admission on 11/27/2024, Discharged on 11/27/2024   Component Date Value Ref Range Status    HCG, Urine 11/27/2024 NEGATIVE  NEGATIVE Final      Physical Exam  Constitutional:       General: She is not in acute distress.  Eyes:      Extraocular Movements: Extraocular movements intact.   Cardiovascular:      Rate and Rhythm: Normal rate and regular rhythm.   Pulmonary:      Breath sounds: Normal breath sounds.   Abdominal:      General: Bowel sounds are normal.   Musculoskeletal:         General: Swelling and tenderness present.      Cervical back: No rigidity.   Skin:     Findings: No rash.   Neurological:      General: No focal deficit present.      Mental Status: She is alert.   Psychiatric:         Thought Content: Thought content normal.         Assessment/Plan     Problem List Items Addressed This Visit       Synovitis of right shoulder     Status post labral repair continue aspirin monitor for abdominal pain nausea         Depression     Continue Easter Lipram         Depression with anxiety - Primary    Relevant Medications    escitalopram (Lexapro) 10 mg tablet    Uses vaginal contraceptive ring    Relevant Medications    etonogestreL-ethinyl estradioL (EluRyng) 0.12-0.015 mg/24 hr vaginal ring   Labs were deferred as patient's arm is in a sling

## 2024-12-12 ENCOUNTER — OFFICE VISIT (OUTPATIENT)
Dept: ORTHOPEDIC SURGERY | Facility: HOSPITAL | Age: 24
End: 2024-12-12
Payer: COMMERCIAL

## 2024-12-12 VITALS — WEIGHT: 155 LBS | BODY MASS INDEX: 25.83 KG/M2 | HEIGHT: 65 IN

## 2024-12-12 DIAGNOSIS — S43.431A GLENOID LABRUM TEAR, RIGHT, INITIAL ENCOUNTER: ICD-10-CM

## 2024-12-12 PROCEDURE — 3008F BODY MASS INDEX DOCD: CPT | Performed by: STUDENT IN AN ORGANIZED HEALTH CARE EDUCATION/TRAINING PROGRAM

## 2024-12-12 PROCEDURE — 99211 OFF/OP EST MAY X REQ PHY/QHP: CPT | Performed by: STUDENT IN AN ORGANIZED HEALTH CARE EDUCATION/TRAINING PROGRAM

## 2024-12-12 PROCEDURE — 1036F TOBACCO NON-USER: CPT | Performed by: STUDENT IN AN ORGANIZED HEALTH CARE EDUCATION/TRAINING PROGRAM

## 2024-12-12 ASSESSMENT — PAIN SCALES - GENERAL: PAINLEVEL_OUTOF10: 1

## 2024-12-12 ASSESSMENT — PAIN - FUNCTIONAL ASSESSMENT: PAIN_FUNCTIONAL_ASSESSMENT: 0-10

## 2024-12-12 NOTE — PROGRESS NOTES
PRIMARY CARE PHYSICIAN: No primary care provider on file.    ORTHOPAEDIC POSTOPERATIVE VISIT    ASSESSMENT & PLAN    Impression: 24 y.o. female 2 weeks postop s/p Right shoulder arthroscopy, anterior stabilization, labral repair, capsulorrhaphy, intra-articular debridement and synovectomy done 11/27/24    Plan:   Overall she is doing excellent.  She will continue with her sling.  She will begin working with physical therapy through the postoperative protocol for the above focusing on regaining her range of motion prior to any strengthening.  She understands her postoperative precautions.  She will continue to ice and rest the shoulder.    I reviewed the intraoperative findings with the patient and answered all their questions. I reviewed their postoperative timeline and plan with them. They understand the postoperative precautions and the treatment plan going forward.     Follow-Up: Patient will follow-up 4 weeks, no x-rays    At the end of the visit, all questions were answered in full. The patient is in agreement with the plan and recommendations. They will call the office with any questions/concerns.      Note dictated with Hypercontext software. Completed without full typed error editing and sent to avoid delay.      SUBJECTIVE  CHIEF COMPLAINT: Postop right shoulder arthroscopic stabilization    HPI: Adry Angel is a 24 y.o. patient who is now 2 weeks postop s/p Right shoulder arthroscopy, anterior stabilization, labral repair, capsulorrhaphy, intra-articular debridement and synovectomy done 11/27/24. Pain is well-managed. She has been wearing sling, as instructed.  She denies any injury to the shoulder since surgery.     REVIEW OF SYSTEMS  Constitutional: See HPI for pain assessment, No significant weight loss, recent trauma  Cardiovascular: No chest pain, shortness of breath  Respiratory: No difficulty breathing, cough  Gastrointestinal: No nausea, vomiting, diarrhea,  constipation  Musculoskeletal: Noted in HPI, positive for pain, restricted motion, stiffness  Integumentary: No rashes, easy bruising, redness   Neurological: no numbness or tingling in extremities, no gait disturbances   Psychiatric: No mood changes, memory changes, social issues  Heme/Lymph: no excessive swelling, easy bruising, excessive bleeding  ENT: No hearing changes  Eyes: No vision changes    CURRENT MEDICATIONS:   Current Outpatient Medications   Medication Sig Dispense Refill    aspirin 81 mg EC tablet Take 1 tablet (81 mg) by mouth 2 times a day for 15 days. 30 tablet 0    escitalopram (Lexapro) 10 mg tablet Take 1 tablet (10 mg) by mouth early in the morning.. 90 tablet 1    etonogestreL-ethinyl estradioL (EluRyng) 0.12-0.015 mg/24 hr vaginal ring Insert vaginally and leave in place for 3 consecutive weeks, then remove for 1 week. 3 each 1     No current facility-administered medications for this visit.        OBJECTIVE    PHYSICAL EXAM      11/27/2024     7:15 AM 11/27/2024     8:48 AM 11/27/2024     8:58 AM 11/27/2024     9:08 AM 11/27/2024     9:18 AM 11/27/2024     9:30 AM 12/3/2024     3:05 PM   Vitals   Systolic 128 138 125 129 125 139 121   Diastolic 83 91 85 89 83 85 77   Heart Rate 83 98 85 104 85 91 92   Temp  37.1 °C (98.8 °F)  36.7 °C (98.1 °F)  36.7 °C (98.1 °F)    Resp 15 21 14 14 16 16    Weight (lb)       155   BMI       25.79 kg/m2   BSA (m2)       1.8 m2   Visit Report       Report      There is no height or weight on file to calculate BMI.    General: Well-appearing, no acute distress    Skin intact bilateral upper and lower extremities  No erythema  No warmth    Detailed examination of the right shoulder demonstrates:  Surgical incision(s) healing well, Steri-Strips in place  No erythema or warmth  No drainage  No ecchymosis  Resolving swelling, minimal  Biceps contour normal  Shoulder range of motion: Forward flexion to 90, ER to a few degrees short of neutral with good endpoint  No  apprehension  Stable to anterior posterior load-and-shift  Upper extremity motor grossly intact  C5-T1 sensation intact bilaterally  2+ radial pulses bilaterally  Warm and well-perfused, brisk capillary refill    IMAGING:   No new imaging      Les Brown MD  Attending Surgeon    Sports Medicine Orthopaedic Surgery  [facility] [place]  [employer]

## 2024-12-13 PROBLEM — Z47.89 ORTHOPEDIC AFTERCARE: Status: ACTIVE | Noted: 2024-12-13

## 2024-12-13 NOTE — PROGRESS NOTES
Physical Therapy    Physical Therapy Evaluation and Treatment      Patient Name: Adry Angel  MRN: [id number]  Today's Date: 12/16/24  Visit 1  Time Entry:   Time Calculation  Start Time: 1015  Stop Time: 1105  Time Calculation (min): 50 min  PT Evaluation Time Entry  PT Evaluation (Low) Time Entry: 25  PT Therapeutic Procedures Time Entry  Manual Therapy Time Entry: 15  Therapeutic Exercise Time Entry: 10      NO AUTH / 10% COINS /  100 VISITS / $3200 DED / $4000 OOP /  ANTHEM / CHAT WITH PAYER / [id number]              Assessment:      Patient presents with clinical signs and symptoms R  shoulder arthroscopic capsulorraphy and labral repair on 11/27/24. She is in a sling with bolster and is comfortable in this position. These impairments affect ADLs, work, recreational activity, exercise, transfer ability, , and sleep function that requires skilled PT intervention to resolve and enable patient to return to previous level of function. Factors that may affect progress in PT is  and patient compliance.  Patient response to initial treatment of  education, exercise, and  instructed in R shoulder PROM per protocol  was understood and demonstrated well by Adry Angel and spouse.      Plan:  OP PT Plan  Treatment/Interventions: Cryotherapy, Education/ Instruction, Hot pack, Manual therapy, Neuromuscular re-education, Self care/ home management, Taping techniques, Therapeutic activities, Therapeutic exercises  PT Plan: Skilled PT  PT Frequency: 1 time per week  Duration: 12  Onset Date: 11/27/24  Certification Period Start Date: 12/16/24  Certification Period End Date: 03/16/25  Rehab Potential: Good  Plan of Care Agreement: Patient        Problem List Items Addressed This Visit             ICD-10-CM    Glenoid labrum tear, right, initial encounter S43.431A    Relevant Orders    Follow Up In Physical Therapy    Instability of right shoulder joint - Primary M25.311    Relevant Orders     Follow Up In Physical Therapy    Synovitis of right shoulder M65.911    Relevant Orders    Follow Up In Physical Therapy    Orthopedic aftercare Z47.89    Relevant Orders    Follow Up In Physical Therapy        Subjective    Adry Angel had R shoulder stabilization procedure on 11/27/24. She has been using sling with bolster as prescribed. She sleeps with sling on.  General  Preferred Learning Style: visual, verbal  Precautions:  Precautions  STEADI Fall Risk Score (The score of 4 or more indicates an increased risk of falling): 0Recent shoulder stabilization procedure, post surgical precautions: see protocol       Pain:   0-3/10  Home Living:   Lives with   Prior Level of Function:  Prior Function Per Pt/Caregiver Report  Hand Dominance: Right  She is a Master level student at [employer]  Full independent ADLs prior to surgery    Objective   Cognition:   A+Ox3  Observation:  Sling with bolster R   Cervical clearance  Clear b      R elbow extension -5 deg    Shoulder ROM:  Flex AROM  R 30 deg  L 170 deg  PROM:  R 95 deg, Abduct AROM:  R30 deg  L170  deg PROM:  R 70 , ER   PROM  R   0 deg   L   95     deg          Shoulder strength:  Flex  R /5   L /5,  Abd  R  /5  L /5, ER R  /5  L  /5, IR  R  /5  L  /5not tested secondary to post op    Scapular stability:   bilat scapulae mobility full and pain free all directions  Palpation: tenderness to    AC joint      rotator interval       long head bicep      + trigger points  Surgical incisional puncture wounds healing well          Outcome Measures:  QuickDash: 29    Treatments:  There ex:  AROM R elbow and forearm, pendulum, scapular elevation as listed below  Manual   Sammy instructed and demonstrated PROM to R shoulder joint within protocol limits  EDUCATION:     extensive education regarding mechanism of injury, relevant functional anatomy, treatment program rational, self management, HEP, and POC   Access Code: [email or web address]  URL:  [email or web address]  Date: 12/16/2024  Prepared by: Rhett London    Exercises  - Horizontal Shoulder Pendulum with Table Support  - 3 x daily - 7 x weekly - 2 sets - 10 reps  - Seated Elbow Flexion and Extension AROM  - 3 x daily - 7 x weekly - 3 sets - 10 reps  - Seated Forearm Pronation Supination AROM  - 3 x daily - 7 x weekly - 3 sets - 10 reps  Goals:  Short term  1. Reduce pain to 0-2/10 level with AROM to end range  2. Anti -gravity 3/5 strength shoulder all directions  3. Increase  shoulder flexion and abduction to AROM to   140 deg   4 Increase  shoulder ER to 40 deg.   5 . independent Home exercise program   Long term  R shoulder ER to 60 deg flex and abduction to 160 deg  R shoulder strength to at 4/ all directions   Improve outcome score by 10 points  Adry Angel will be able to carry 10 lbs at side and place 5 lbs over head x 5 reps

## 2024-12-16 ENCOUNTER — EVALUATION (OUTPATIENT)
Dept: PHYSICAL THERAPY | Facility: CLINIC | Age: 24
End: 2024-12-16
Payer: COMMERCIAL

## 2024-12-16 DIAGNOSIS — Z47.89 ORTHOPEDIC AFTERCARE: ICD-10-CM

## 2024-12-16 DIAGNOSIS — S43.431A GLENOID LABRUM TEAR, RIGHT, INITIAL ENCOUNTER: ICD-10-CM

## 2024-12-16 DIAGNOSIS — M25.311 INSTABILITY OF RIGHT SHOULDER JOINT: Primary | ICD-10-CM

## 2024-12-16 DIAGNOSIS — M65.911 SYNOVITIS OF RIGHT SHOULDER: ICD-10-CM

## 2024-12-16 PROCEDURE — 97110 THERAPEUTIC EXERCISES: CPT | Mod: GP | Performed by: PHYSICAL THERAPIST

## 2024-12-16 PROCEDURE — 97140 MANUAL THERAPY 1/> REGIONS: CPT | Mod: GP | Performed by: PHYSICAL THERAPIST

## 2024-12-16 PROCEDURE — 97161 PT EVAL LOW COMPLEX 20 MIN: CPT | Mod: GP | Performed by: PHYSICAL THERAPIST

## 2025-01-02 ENCOUNTER — TREATMENT (OUTPATIENT)
Dept: PHYSICAL THERAPY | Facility: CLINIC | Age: 25
End: 2025-01-02
Payer: COMMERCIAL

## 2025-01-02 DIAGNOSIS — Z47.89 ORTHOPEDIC AFTERCARE: ICD-10-CM

## 2025-01-02 DIAGNOSIS — M25.311 INSTABILITY OF RIGHT SHOULDER JOINT: Primary | ICD-10-CM

## 2025-01-02 DIAGNOSIS — S43.431A GLENOID LABRUM TEAR, RIGHT, INITIAL ENCOUNTER: ICD-10-CM

## 2025-01-02 DIAGNOSIS — M65.911 SYNOVITIS OF RIGHT SHOULDER: ICD-10-CM

## 2025-01-02 PROCEDURE — 97140 MANUAL THERAPY 1/> REGIONS: CPT | Mod: GP,CQ | Performed by: PHYSICAL THERAPY ASSISTANT

## 2025-01-02 PROCEDURE — 97110 THERAPEUTIC EXERCISES: CPT | Mod: GP,CQ | Performed by: PHYSICAL THERAPY ASSISTANT

## 2025-01-02 NOTE — PROGRESS NOTES
Physical Therapy    Physical Therapy Treatment    Patient Name: Adry Angel  MRN: [id number]  Today's Date: 1/2/2025    Time Entry:   Time Calculation  Start Time: 1030  Stop Time: 1115  Time Calculation (min): 45 min     PT Therapeutic Procedures Time Entry  Manual Therapy Time Entry: 15  Therapeutic Exercise Time Entry: 25                   Assessment:  Good return demo of HEP. Encouraged her to not assist during PROM . Did well with isometrics w/ brace donned, per protocol.   Plan:  Continue per protocol.     Current Problem    Problem List Items Addressed This Visit             ICD-10-CM    Glenoid labrum tear, right, initial encounter S43.431A    Instability of right shoulder joint - Primary M25.311    Synovitis of right shoulder M65.911    Orthopedic aftercare Z47.89       Subjective    Compliant w/ HEP. Accompanied by her , Sammy. Returns to classes mid-January but will not need to use her R UE.   Precautions  Surg date: 11/27/24    Pain  None     Objective   Sling w/ bolster donned   Treatments:  Manual :   PROM to R shoulder joint within protocol limits   Therapeutic exercises:   - Horizontal Shoulder Pendulum with Table Support  - 3 x daily - 7 x weekly - 2 sets - 10 reps  - Seated Elbow Flexion and Extension AROM  - 3 x daily - 7 x weekly - 3 sets - 10 reps  - Seated Forearm Pronation Supination AROM  - 3 x daily - 7 x weekly - 3 sets - 10 reps  -Seated w/ brace donned 4 way isometrics 3 sec hold x 10 reps (flexion, ext, add, abd)   -CP to R shoulder seated x 5 min   OP EDUCATION:       Goals:  Short term  1. Reduce pain to 0-2/10 level with AROM to end range  2. Anti -gravity 3/5 strength shoulder all directions  3. Increase  shoulder flexion and abduction to AROM to   140 deg   4 Increase  shoulder ER to 40 deg.   5 . independent Home exercise program   Long term  R shoulder ER to 60 deg flex and abduction to 160 deg  R shoulder strength to at 4/ all directions   Improve outcome score by 10  points  Adry Angel will be able to carry 10 lbs at side and place 5 lbs over head x 5 reps

## 2025-01-08 ENCOUNTER — TREATMENT (OUTPATIENT)
Dept: PHYSICAL THERAPY | Facility: CLINIC | Age: 25
End: 2025-01-08
Payer: COMMERCIAL

## 2025-01-08 DIAGNOSIS — M25.311 INSTABILITY OF RIGHT SHOULDER JOINT: ICD-10-CM

## 2025-01-08 DIAGNOSIS — Z47.89 ORTHOPEDIC AFTERCARE: ICD-10-CM

## 2025-01-08 DIAGNOSIS — S43.431A GLENOID LABRUM TEAR, RIGHT, INITIAL ENCOUNTER: ICD-10-CM

## 2025-01-08 DIAGNOSIS — M65.911 SYNOVITIS OF RIGHT SHOULDER: ICD-10-CM

## 2025-01-08 PROCEDURE — 97140 MANUAL THERAPY 1/> REGIONS: CPT | Mod: GP,CQ | Performed by: PHYSICAL THERAPY ASSISTANT

## 2025-01-08 PROCEDURE — 97110 THERAPEUTIC EXERCISES: CPT | Mod: GP,CQ | Performed by: PHYSICAL THERAPY ASSISTANT

## 2025-01-08 NOTE — PROGRESS NOTES
Physical Therapy    Physical Therapy Treatment    Patient Name: Adry Angel  MRN: [id number]  Today's Date: 1/8/2025    Time Entry:   Time Calculation  Start Time: 0945  Stop Time: 1030  Time Calculation (min): 45 min     PT Therapeutic Procedures Time Entry  Manual Therapy Time Entry: 15  Therapeutic Exercise Time Entry: 30         Insurance : [place]   Authorization /Certification / Visits allowed: 100   Visit 3            Assessment:  A little tight w/ PROM and she reports using her arm during writing, mousing, and typing. Able to progress to isometrics w/ UE at side.   Plan:  Check MD note. Continue per protocol.     Current Problem    Problem List Items Addressed This Visit             ICD-10-CM    Glenoid labrum tear, right, initial encounter S43.431A    Instability of right shoulder joint M25.311    Synovitis of right shoulder M65.911    Orthopedic aftercare Z47.89         Subjective    Compliant w/ HEP. Feeling a little tighter. Reports using her arm while doing classwork and on her laptop.   Precautions  Surg date: 11/27/24    Pain  None     Objective   Sling w/ bolster donned   Treatments:  Manual :   PROM to R shoulder joint within protocol limits   Therapeutic exercises:   - Horizontal Shoulder Pendulum with Table Support  - 3 x daily - 7 x weekly - 2 sets - 10 reps  - Seated Elbow Flexion and Extension AROM  - 3 x daily - 7 x weekly - 3 sets - 10 reps  - Seated Forearm Pronation Supination AROM  - 3 x daily - 7 x weekly - 3 sets - 10 reps  - 4 way isometrics w/ UE at side 3 sec hold x 10 reps (flexion, ext, add, abd)   - IR/ER isometrics w/ elbow at 90 deg x 10 reps   -CP to R shoulder seated x 5 min   OP EDUCATION:       Goals:  Short term  1. Reduce pain to 0-2/10 level with AROM to end range  2. Anti -gravity 3/5 strength shoulder all directions  3. Increase  shoulder flexion and abduction to AROM to   140 deg   4 Increase  shoulder ER to 40 deg.   5 . independent Home exercise program   Long term  R  shoulder ER to 60 deg flex and abduction to 160 deg  R shoulder strength to at 4/ all directions   Improve outcome score by 10 points  Adry Angel will be able to carry 10 lbs at side and place 5 lbs over head x 5 reps

## 2025-01-09 ENCOUNTER — OFFICE VISIT (OUTPATIENT)
Dept: ORTHOPEDIC SURGERY | Facility: HOSPITAL | Age: 25
End: 2025-01-09
Payer: COMMERCIAL

## 2025-01-09 VITALS — WEIGHT: 160 LBS | BODY MASS INDEX: 26.66 KG/M2 | HEIGHT: 65 IN

## 2025-01-09 DIAGNOSIS — M25.311 INSTABILITY OF RIGHT SHOULDER JOINT: Primary | ICD-10-CM

## 2025-01-09 PROCEDURE — 3008F BODY MASS INDEX DOCD: CPT | Performed by: STUDENT IN AN ORGANIZED HEALTH CARE EDUCATION/TRAINING PROGRAM

## 2025-01-09 PROCEDURE — 99211 OFF/OP EST MAY X REQ PHY/QHP: CPT | Performed by: STUDENT IN AN ORGANIZED HEALTH CARE EDUCATION/TRAINING PROGRAM

## 2025-01-09 PROCEDURE — 1036F TOBACCO NON-USER: CPT | Performed by: STUDENT IN AN ORGANIZED HEALTH CARE EDUCATION/TRAINING PROGRAM

## 2025-01-09 ASSESSMENT — PAIN - FUNCTIONAL ASSESSMENT: PAIN_FUNCTIONAL_ASSESSMENT: NO/DENIES PAIN

## 2025-01-09 NOTE — PROGRESS NOTES
PRIMARY CARE PHYSICIAN: Froylan Heller MD    ORTHOPAEDIC POSTOPERATIVE VISIT    ASSESSMENT & PLAN    Impression: 24 y.o. female 6 weeks postop s/p Right shoulder arthroscopy, anterior stabilization, labral repair, capsulorrhaphy, intra-articular debridement and synovectomy done 11/27/24.    Plan:   Overall she is doing excellent.  She will discontinue her sling.  She will continue working with physical therapy through the postoperative protocol for the above focusing on regaining her range of motion as she progresses towards strengthening.  She understands her postoperative precautions.  She will continue to ice and rest the shoulder.    I reviewed their postoperative timeline and plan with them. They understand the postoperative precautions and the treatment plan going forward.     Follow-Up: Patient will follow-up 6 weeks, no x-rays    At the end of the visit, all questions were answered in full. The patient is in agreement with the plan and recommendations. They will call the office with any questions/concerns.      Note dictated with Sparrow software. Completed without full typed error editing and sent to avoid delay.      SUBJECTIVE  CHIEF COMPLAINT: Postop right shoulder arthroscopic stabilization    HPI: Adry Angel is a 24 y.o. patient who is now 6 weeks postop s/p Right shoulder arthroscopy, anterior stabilization, labral repair, capsulorrhaphy, intra-articular debridement and synovectomy done 11/27/24. Adry is doing well. She is progressing through physical therapy, ROM continues to improve. No concerns.     REVIEW OF SYSTEMS  Constitutional: See HPI for pain assessment, No significant weight loss, recent trauma  Cardiovascular: No chest pain, shortness of breath  Respiratory: No difficulty breathing, cough  Gastrointestinal: No nausea, vomiting, diarrhea, constipation  Musculoskeletal: Noted in HPI, positive for pain, restricted motion, stiffness  Integumentary: No rashes, easy  "bruising, redness   Neurological: no numbness or tingling in extremities, no gait disturbances   Psychiatric: No mood changes, memory changes, social issues  Heme/Lymph: no excessive swelling, easy bruising, excessive bleeding  ENT: No hearing changes  Eyes: No vision changes    CURRENT MEDICATIONS:   Current Outpatient Medications   Medication Sig Dispense Refill    escitalopram (Lexapro) 10 mg tablet Take 1 tablet (10 mg) by mouth early in the morning.. 90 tablet 1    etonogestreL-ethinyl estradioL (EluRyng) 0.12-0.015 mg/24 hr vaginal ring Insert vaginally and leave in place for 3 consecutive weeks, then remove for 1 week. 3 each 1     No current facility-administered medications for this visit.        OBJECTIVE    PHYSICAL EXAM      11/27/2024     8:48 AM 11/27/2024     8:58 AM 11/27/2024     9:08 AM 11/27/2024     9:18 AM 11/27/2024     9:30 AM 12/3/2024     3:05 PM 12/12/2024    12:40 PM   Vitals   Systolic 138 125 129 125 139 121    Diastolic 91 85 89 83 85 77    Heart Rate 98 85 104 85 91 92    Temp 37.1 °C (98.8 °F)  36.7 °C (98.1 °F)  36.7 °C (98.1 °F)     Resp 21 14 14 16 16     Height       1.651 m (5' 5\")   Weight (lb)      155 155   BMI      25.79 kg/m2 25.79 kg/m2   BSA (m2)      1.8 m2 1.8 m2   Visit Report      Report Report      There is no height or weight on file to calculate BMI.    General: Well-appearing, no acute distress    Skin intact bilateral upper and lower extremities  No erythema  No warmth    Detailed examination of the right shoulder demonstrates:  Surgical incision(s) well-healed  No erythema or warmth  No ecchymosis or significant soft tissue swelling  Biceps contour normal  Shoulder range of motion: Forward flexion to 120, ER to 10  No apprehension  Stable to anterior posterior load-and-shift  Upper extremity motor grossly intact  C5-T1 sensation intact bilaterally  2+ radial pulses bilaterally  Warm and well-perfused, brisk capillary refill    IMAGING:   No new imaging      Les " CHARLENE Brown MD  Attending Surgeon    Sports Medicine Orthopaedic Surgery  [facility] [place]  [facility] [employer]

## 2025-01-17 ENCOUNTER — APPOINTMENT (OUTPATIENT)
Dept: PHYSICAL THERAPY | Facility: CLINIC | Age: 25
End: 2025-01-17
Payer: COMMERCIAL

## 2025-01-21 ENCOUNTER — TREATMENT (OUTPATIENT)
Dept: PHYSICAL THERAPY | Facility: CLINIC | Age: 25
End: 2025-01-21
Payer: COMMERCIAL

## 2025-01-21 DIAGNOSIS — M25.311 INSTABILITY OF RIGHT SHOULDER JOINT: Primary | ICD-10-CM

## 2025-01-21 DIAGNOSIS — M65.911 SYNOVITIS OF RIGHT SHOULDER: ICD-10-CM

## 2025-01-21 DIAGNOSIS — S43.431A GLENOID LABRUM TEAR, RIGHT, INITIAL ENCOUNTER: ICD-10-CM

## 2025-01-21 DIAGNOSIS — Z47.89 ORTHOPEDIC AFTERCARE: ICD-10-CM

## 2025-01-21 PROCEDURE — 97140 MANUAL THERAPY 1/> REGIONS: CPT | Mod: GP,CQ | Performed by: PHYSICAL THERAPY ASSISTANT

## 2025-01-21 PROCEDURE — 97110 THERAPEUTIC EXERCISES: CPT | Mod: GP,CQ | Performed by: PHYSICAL THERAPY ASSISTANT

## 2025-01-21 NOTE — PROGRESS NOTES
Physical Therapy    Physical Therapy Treatment    Patient Name: Adry Angel  MRN: [id number]  Today's Date: 1/21/2025    Time Entry:   Time Calculation  Start Time: 1700  Stop Time: 1750  Time Calculation (min): 50 min     PT Therapeutic Procedures Time Entry  Manual Therapy Time Entry: 15  Therapeutic Exercise Time Entry: 35         Insurance : [place]   Authorization /Certification / Visits allowed: 100   Visit 3            Assessment:  Did well progressing to AAROM / AROM exercises . She felt anxious w/ assisted abduction since this was the [facility].   Plan:   Continue per protocol.     Current Problem    Problem List Items Addressed This Visit             ICD-10-CM    Glenoid labrum tear, right, initial encounter S43.431A    Instability of right shoulder joint - Primary M25.311    Synovitis of right shoulder M65.911    Orthopedic aftercare Z47.89         Subjective    MD was pleased with her progress. He d/c'd her sling. She is accompanied by Sammy , her spouse  .  Precautions  Surg date: 11/27/24  6 weeks 1/8/25  8 weeks 1/22/25  Pain  None     Objective   No sling   Treatments:  Manual :   PROM to ER at 45 deg, flexion to 165 deg , abduction to 90   Instructed Sammy and he performed in progression of PROM   Therapeutic exercises:   Active ROM while holding onto table : Rotation toward/ away from body w/ squat x 20 reps   Long axis active ROM : Hand fixed on tabletop + full elbow extension  Rotation toward/ away from body w/ squat x 20 reps   6 way isometrics w/ elbow flexion  3 sec hold x 10 reps (flexion, ext, add, abd, ER, IR )   Wand ER seated x 10 reps w/ 2 sec hold   Wand Abduction to 90 deg (pt pref)  x 10 reps w/ 2 sec hold  Wand extension  x 10 reps w/ 2 sec hold  Bent over rows 2 x 10   -CP to R shoulder seated x 5 min   OP EDUCATION:       Goals:  Short term  1. Reduce pain to 0-2/10 level with AROM to end range  2. Anti -gravity 3/5 strength shoulder all directions  3. Increase  shoulder flexion and  abduction to AROM to   140 deg   4 Increase  shoulder ER to 40 deg.   5 . independent Home exercise program   Long term  R shoulder ER to 60 deg flex and abduction to 160 deg  R shoulder strength to at 4/ all directions   Improve outcome score by 10 points  Adry Angel will be able to carry 10 lbs at side and place 5 lbs over head x 5 reps

## 2025-01-24 ENCOUNTER — APPOINTMENT (OUTPATIENT)
Dept: PHYSICAL THERAPY | Facility: CLINIC | Age: 25
End: 2025-01-24
Payer: COMMERCIAL

## 2025-01-30 NOTE — PROGRESS NOTES
Physical Therapy    Physical Therapy Treatment    Patient Name: Adry Angel  MRN: [id number]  Today's Date: 1/31/25    Time Entry:   Time Calculation  Start Time: 0900  Stop Time: 0945  Time Calculation (min): 45 min     PT Therapeutic Procedures Time Entry  Manual Therapy Time Entry: 15  Therapeutic Exercise Time Entry: 30         Insurance : [place]   Authorization /Certification / Visits allowed: 100   Visit 6            Assessment:  We progressed Adry Angel to light resistive supine press and row as she demos good unloaded R UE motor control. She is less apprehensive with R shoulder elevation now. She needs tactile and verbal cuing to minimize R scapular elevation during overhead reach exercises.  Plan:   Progress to week 10  per protocol.     Current Problem    Problem List Items Addressed This Visit             ICD-10-CM    Glenoid labrum tear, right, initial encounter S43.431A    Instability of right shoulder joint M25.311    Synovitis of right shoulder M65.911    Orthopedic aftercare - Primary Z47.89         Subjective    Adry Angel reports that reaching overhead is apprehensive, not painful        Pain  None     Objective   No sling   Treatments:  Manual :   Passive stretch into elevation with emphasis on ER 2x 30 sec  PNF D1 And D2 x 10 each  Therapeutic exercises:   Tball rollout 30 sec each direction fwd, CW, and CCW  Tball isometrics: pushdown, push fwd 5 x 5 sec  Supine press 2lbs 2x10  Canoe paddle x 20   Bent over rows 3 lb 2 x 10   Wall slide flexion with forward step x 10  UBE 6 min alternating directions every 1 min    OP EDUCATION:   Access Code: [email or web address]  URL: [email or web address]  Date: 01/31/2025  Prepared by: Rhett London    Exercises  - Standing Bent Over Single Arm Shoulder Row  - 1 x daily - 7 x weekly - 2 sets - 20 reps  - Supine Chest Press with Dumbbells  - 1 x daily - 7 x weekly - 2 sets - 20 reps    Goals:  Short term  1. Reduce pain to 0-2/10 level with AROM  to end range  2. Anti -gravity 3/5 strength shoulder all directions  3. Increase  shoulder flexion and abduction to AROM to   140 deg   4 Increase  shoulder ER to 40 deg.   5 . independent Home exercise program   Long term  R shoulder ER to 60 deg flex and abduction to 160 deg  R shoulder strength to at 4/ all directions   Improve outcome score by 10 points  Adry Angel will be able to carry 10 lbs at side and place 5 lbs over head x 5 reps

## 2025-01-31 ENCOUNTER — TREATMENT (OUTPATIENT)
Dept: PHYSICAL THERAPY | Facility: CLINIC | Age: 25
End: 2025-01-31
Payer: COMMERCIAL

## 2025-01-31 DIAGNOSIS — S43.431A GLENOID LABRUM TEAR, RIGHT, INITIAL ENCOUNTER: ICD-10-CM

## 2025-01-31 DIAGNOSIS — M65.911 SYNOVITIS OF RIGHT SHOULDER: ICD-10-CM

## 2025-01-31 DIAGNOSIS — Z47.89 ORTHOPEDIC AFTERCARE: Primary | ICD-10-CM

## 2025-01-31 DIAGNOSIS — M25.311 INSTABILITY OF RIGHT SHOULDER JOINT: ICD-10-CM

## 2025-01-31 PROCEDURE — 97140 MANUAL THERAPY 1/> REGIONS: CPT | Mod: GP | Performed by: PHYSICAL THERAPIST

## 2025-01-31 PROCEDURE — 97110 THERAPEUTIC EXERCISES: CPT | Mod: GP | Performed by: PHYSICAL THERAPIST

## 2025-02-07 ENCOUNTER — APPOINTMENT (OUTPATIENT)
Dept: PHYSICAL THERAPY | Facility: CLINIC | Age: 25
End: 2025-02-07
Payer: COMMERCIAL

## 2025-02-07 ENCOUNTER — DOCUMENTATION (OUTPATIENT)
Dept: PHYSICAL THERAPY | Facility: CLINIC | Age: 25
End: 2025-02-07
Payer: COMMERCIAL

## 2025-02-07 NOTE — PROGRESS NOTES
Physical Therapy                 Therapy Communication Note    Patient Name: Adry Angel  MRN: [id number]  Department:   Room: Room/bed info not found  Today's Date: 2/7/2025     Discipline: Physical Therapy          Missed Visit Reason:      Missed Time: Cancel    Comment: 3rd cancellation

## 2025-02-13 ENCOUNTER — APPOINTMENT (OUTPATIENT)
Dept: ORTHOPEDIC SURGERY | Facility: HOSPITAL | Age: 25
End: 2025-02-13
Payer: COMMERCIAL

## 2025-02-14 ENCOUNTER — APPOINTMENT (OUTPATIENT)
Dept: PHYSICAL THERAPY | Facility: CLINIC | Age: 25
End: 2025-02-14
Payer: COMMERCIAL

## 2025-02-20 ENCOUNTER — OFFICE VISIT (OUTPATIENT)
Facility: HOSPITAL | Age: 25
End: 2025-02-20
Payer: COMMERCIAL

## 2025-02-20 VITALS
OXYGEN SATURATION: 99 % | TEMPERATURE: 97.3 F | SYSTOLIC BLOOD PRESSURE: 131 MMHG | WEIGHT: 175.8 LBS | HEIGHT: 65 IN | DIASTOLIC BLOOD PRESSURE: 83 MMHG | HEART RATE: 74 BPM | BODY MASS INDEX: 29.29 KG/M2

## 2025-02-20 DIAGNOSIS — Z76.89 ESTABLISHING CARE WITH NEW DOCTOR, ENCOUNTER FOR: Primary | ICD-10-CM

## 2025-02-20 DIAGNOSIS — Z78.9 USES VAGINAL CONTRACEPTIVE RING: ICD-10-CM

## 2025-02-20 RX ORDER — ETONOGESTREL AND ETHINYL ESTRADIOL VAGINAL RING .015; .12 MG/D; MG/D
RING VAGINAL
Qty: 3 EACH | Refills: 1 | Status: SHIPPED | OUTPATIENT
Start: 2025-02-20

## 2025-02-20 ASSESSMENT — PATIENT HEALTH QUESTIONNAIRE - PHQ9
SUM OF ALL RESPONSES TO PHQ9 QUESTIONS 1 AND 2: 0
1. LITTLE INTEREST OR PLEASURE IN DOING THINGS: NOT AT ALL
2. FEELING DOWN, DEPRESSED OR HOPELESS: NOT AT ALL

## 2025-02-20 ASSESSMENT — PAIN SCALES - GENERAL: PAINLEVEL_OUTOF10: 0-NO PAIN

## 2025-02-20 ASSESSMENT — ENCOUNTER SYMPTOMS
DEPRESSION: 0
LOSS OF SENSATION IN FEET: 0
OCCASIONAL FEELINGS OF UNSTEADINESS: 0

## 2025-02-20 NOTE — PROGRESS NOTES
Subjective   Patient ID: Adry Angel is a 25 y.o. female with PMH of Depression, R Glenoid labrum tear s/p repair () who presents to establish care with new PCP. Additional concern(s): Establish Care.    HPI:  Pt recently saw Dr Heller, however, due to location patient is here to establish care with a new provider.     Patient has no acute concerns.     Patient had shoulder surgery recently and has been out of her sling since 27 and has been doing well. She continues to work with physical therapy without and concerns.      Allergies - NKDA  Surgical Hx - as above  Family Hx - Colon CA Paternal Grand father, Maternal Great GFA with colon cancer, Maternal GFA with prostate cancer.  Social Hx- [employer] school, diet is well balanced, exercise has been lower due to shoulder injury. Never smoker, no marijuana, no alcohol use, no other illicit drug use.   OBGYN Hx - LMP in November due to BC ring, currently sexually active with , no current plans of becoming pregnant. . Had a Pap smear _ HPC testing done at 24 yo which patient states was normal.     12 system ROS completed, negative except as noted above.    Patient care team:  No care team member to display     Patient Active Problem List   Diagnosis    Glenoid labrum tear, right, initial encounter    Instability of right shoulder joint    Synovitis of right shoulder    Anxiety    Depression    Depression with anxiety    Uses vaginal contraceptive ring    Orthopedic aftercare     Past Medical History:   Diagnosis Date    Anxiety     Depression     Urinary tract infection      Past Surgical History:   Procedure Laterality Date    NO PAST SURGERIES       Current Outpatient Medications   Medication Sig Dispense Refill    escitalopram (Lexapro) 10 mg tablet Take 1 tablet (10 mg) by mouth early in the morning.. 90 tablet 1    etonogestreL-ethinyl estradioL (EluRyng) 0.12-0.015 mg/24 hr vaginal ring Insert vaginally and leave in place for 3  "consecutive weeks, then remove for 1 week. 3 each 1     No current facility-administered medications for this visit.      No Known Allergies  Social History     Socioeconomic History    Marital status:      Spouse name: Not on file    Number of children: Not on file    Years of education: Not on file    Highest education level: Not on file   Occupational History    Not on file   Tobacco Use    Smoking status: Never    Smokeless tobacco: Never   Vaping Use    Vaping status: Never Used   Substance and Sexual Activity    Alcohol use: Not Currently    Drug use: Never    Sexual activity: Not on file   Other Topics Concern    Not on file   Social History Narrative    Not on file     Social Drivers of Health     Financial Resource Strain: Not on file   Food Insecurity: Not on file   Transportation Needs: Not on file   Physical Activity: Not on file   Stress: Not on file   Social Connections: Not on file   Intimate Partner Violence: Not on file   Housing Stability: Not on file     No family history on file.     Social Drivers of Health     Tobacco Use: Low Risk  (2/20/2025)    Patient History     Smoking Tobacco Use: Never     Smokeless Tobacco Use: Never     Passive Exposure: Not on file   Alcohol Use: Not on file   Financial Resource Strain: Not on file   Food Insecurity: Not on file   Transportation Needs: Not on file   Physical Activity: Not on file   Stress: Not on file   Social Connections: Not on file   Intimate Partner Violence: Not on file   Depression: Not at risk (2/20/2025)    PHQ-2     PHQ-2 Score: 0   Housing Stability: Not on file   Utilities: Not on file   Digital Equity: Not on file   Health Literacy: Not on file        Objective   Vitals: /83 (BP Location: Right arm, Patient Position: Sitting)   Pulse 74   Temp 36.3 °C (97.3 °F) (Temporal)   Ht 1.651 m (5' 5\")   Wt 79.7 kg (175 lb 12.8 oz)   SpO2 99%   BMI 29.25 kg/m²      Physical Exam  Constitutional:       General: She is not in acute " distress.     Appearance: Normal appearance. She is not ill-appearing.   Cardiovascular:      Rate and Rhythm: Normal rate and regular rhythm.      Pulses: Normal pulses.      Heart sounds: Normal heart sounds. No murmur heard.     No gallop.   Pulmonary:      Effort: Pulmonary effort is normal.      Breath sounds: Normal breath sounds. No stridor. No wheezing.   Abdominal:      General: Abdomen is flat. Bowel sounds are normal. There is no distension.      Palpations: Abdomen is soft. There is no mass.      Tenderness: There is no abdominal tenderness. There is no guarding.   Skin:     General: Skin is warm and dry.      Coloration: Skin is not jaundiced or pale.      Findings: No erythema or rash.   Neurological:      General: No focal deficit present.      Mental Status: She is alert and oriented to person, place, and time.   Psychiatric:         Mood and Affect: Mood normal.         Behavior: Behavior normal.         Assessment/Plan   Problem List Items Addressed This Visit       Uses vaginal contraceptive ring    Relevant Medications    etonogestreL-ethinyl estradioL (EluRyng) 0.12-0.015 mg/24 hr vaginal ring     Other Visit Diagnoses       Establishing care with new doctor, encounter for    -  Primary            Adry Angel is a 24 yo F with PMH of Depression, R Glenoid labrum tear s/p repair (2024) who presents to establish care with new PCP.     #Establish Care  #medication refill  - patient wit no acute complaints or concerns  - continues to follow with ortho and PT  - Stable on all medications, no adverse side effects reported  - refilled EluRyng      RTC in 2-3 months for Yearly Physical, or earlier as needed.    Patient discussed with attending physician Dr. Dixon  Plan preliminary until cosigned by attending physician.    Demetri Bains MD  Family Medicine  PGY-2

## 2025-02-21 ENCOUNTER — TREATMENT (OUTPATIENT)
Dept: PHYSICAL THERAPY | Facility: CLINIC | Age: 25
End: 2025-02-21
Payer: COMMERCIAL

## 2025-02-21 DIAGNOSIS — M65.911 SYNOVITIS OF RIGHT SHOULDER: ICD-10-CM

## 2025-02-21 DIAGNOSIS — M25.311 INSTABILITY OF RIGHT SHOULDER JOINT: Primary | ICD-10-CM

## 2025-02-21 DIAGNOSIS — S43.431A GLENOID LABRUM TEAR, RIGHT, INITIAL ENCOUNTER: ICD-10-CM

## 2025-02-21 DIAGNOSIS — Z47.89 ORTHOPEDIC AFTERCARE: ICD-10-CM

## 2025-02-21 PROCEDURE — 97140 MANUAL THERAPY 1/> REGIONS: CPT | Mod: GP,CQ | Performed by: PHYSICAL THERAPY ASSISTANT

## 2025-02-21 PROCEDURE — 97110 THERAPEUTIC EXERCISES: CPT | Mod: GP,CQ | Performed by: PHYSICAL THERAPY ASSISTANT

## 2025-02-21 NOTE — PROGRESS NOTES
"Physical Therapy    Physical Therapy Treatment    Patient Name: Adry Angel  MRN: [id number]  Today's Date: 2/21/2025    Time Entry:   Time Calculation  Start Time: 1000  Stop Time: 1100  Time Calculation (min): 60 min     PT Therapeutic Procedures Time Entry  Manual Therapy Time Entry: 25  Therapeutic Exercise Time Entry: 30         Insurance : [place]   Authorization /Certification / Visits allowed: 100   Visit 7      Assessment:   Adry Angel was very tight in ER at beginning of the session , she thinks d/t decreased HEP from head cold lasting 1.5 weeks. Her R scap is very tight and unable to distract but tolerated PROM and STM along medial border. She was performing ER stretch at doorway but compensates w/ trunk rotation. Tolerated sustained static ER stretch in supine w/ #2 wt.    Plan:  Distract R scap w/ IR positioning , if needed.    Progress to week 10  per protocol.     Current Problem    Problem List Items Addressed This Visit             ICD-10-CM    Glenoid labrum tear, right, initial encounter S43.431A    Instability of right shoulder joint - Primary M25.311    Synovitis of right shoulder M65.911    Orthopedic aftercare Z47.89         Subjective    Adry Angel states she is now able to wash her hair and reaching with more confidence. States her R shoulder feels \"back to normal.\" Compliant w/ HEP.       Pain  None , tight     Objective   No sling   PROM ER 0-20 w/ tight end feel in abduction, 0-20 at 90/90  Treatments:  Manual :   STM pecs, subscap, rhomboids, R levator   PROM scap w/ tilts , rocking   Passive stretch into elevation with emphasis on ER 2x 30 sec  PNF D1 And D2 x 10 each--not today   Therapeutic exercises:   Tball rollout 30 sec each direction fwd, CW, and CCW w/ HP   Tball isometrics: pushdown, push fwd 5 x 5 sec  Supine press 2lbs 2x10  Canoe paddle x 20   Bent over rows 3 lb 2 x 10 , cues for shoulder rectraction at end range   Wall slide flexion with forward step x 10  ER corner " stretch 5 x 30 sec hold ; trunk twisting   Supine ER sustained stretch w/ UE at 90/90 1 min hold x 5 reps   UBE 6 min alternating directions every 1 min--not today     OP EDUCATION:   Access Code: [email or web address]  URL: [email or web address]  Date: 01/31/2025  Prepared by: Rhett London    Exercises  - Standing Bent Over Single Arm Shoulder Row  - 1 x daily - 7 x weekly - 2 sets - 20 reps  - Supine Chest Press with Dumbbells  - 1 x daily - 7 x weekly - 2 sets - 20 reps  -90/90 sustained ER stretch #2  Goals:  Short term  1. Reduce pain to 0-2/10 level with AROM to end range  2. Anti -gravity 3/5 strength shoulder all directions  3. Increase  shoulder flexion and abduction to AROM to   140 deg   4 Increase  shoulder ER to 40 deg.   5 . independent Home exercise program   Long term  R shoulder ER to 60 deg flex and abduction to 160 deg  R shoulder strength to at 4/ all directions   Improve outcome score by 10 points  Adry Angel will be able to carry 10 lbs at side and place 5 lbs over head x 5 reps

## 2025-02-28 ENCOUNTER — TREATMENT (OUTPATIENT)
Dept: PHYSICAL THERAPY | Facility: CLINIC | Age: 25
End: 2025-02-28
Payer: COMMERCIAL

## 2025-02-28 DIAGNOSIS — S43.431A GLENOID LABRUM TEAR, RIGHT, INITIAL ENCOUNTER: ICD-10-CM

## 2025-02-28 DIAGNOSIS — M25.311 INSTABILITY OF RIGHT SHOULDER JOINT: ICD-10-CM

## 2025-02-28 DIAGNOSIS — M65.911 SYNOVITIS OF RIGHT SHOULDER: ICD-10-CM

## 2025-02-28 DIAGNOSIS — Z47.89 ORTHOPEDIC AFTERCARE: ICD-10-CM

## 2025-02-28 PROCEDURE — 97140 MANUAL THERAPY 1/> REGIONS: CPT | Mod: GP | Performed by: PHYSICAL THERAPIST

## 2025-02-28 PROCEDURE — 97110 THERAPEUTIC EXERCISES: CPT | Mod: GP | Performed by: PHYSICAL THERAPIST

## 2025-02-28 NOTE — PROGRESS NOTES
Physical Therapy    Physical Therapy Treatment    Patient Name: Adry Angel  MRN: [id number]  Today's Date: 2/21/2025    Time Entry:   Time Calculation  Start Time: 1000  Stop Time: 1045  Time Calculation (min): 45 min     PT Therapeutic Procedures Time Entry  Manual Therapy Time Entry: 15  Therapeutic Exercise Time Entry: 30         Insurance : [place]   Authorization /Certification / Visits allowed: 100   Visit 7      Assessment:  Adry Angel is on schedule with post op R shoulder stabilization protocol. She had 40 deg R shoulder PROM ER with 90 deg shoulder abduction post treatment. She was able to carry 10 lbs in R hand for 40 ft farmer's carry without difficulty. I put in for a skilled PT extension to progress Adry Angel through post op protocol time frames   Plan:  Continue to progress load and stretch intensity per healing phase 3 , Resisted pull downs    Current Problem    Problem List Items Addressed This Visit             ICD-10-CM    Glenoid labrum tear, right, initial encounter S43.431A    Instability of right shoulder joint M25.311    Synovitis of right shoulder M65.911    Orthopedic aftercare Z47.89         Subjective    Adry Angel feels that she is progressing on schedule      Pain  None , tight     Objective     PROM ER 0-20 w/ tight end feel in abduction, 0-20 at 90/90  Treatments:  Manual :   STM pecs, subscap, rhomboids, R levator   PROM scap w/ tilts , rocking   Passive stretch into elevation with emphasis on ER 2x 30 sec  PNF D1 And D2 x 10 each--  Therapeutic exercises:   Tball rollout 30 sec each direction fwd, CW, and CCW w/ HP   Scapula depression with red tband pull  ( hike) 2 x 10  Standing shoulder extension 1 arm ylw tband x 10  Standing push with a plus ylw x 10  Farmer's carry (2)10 lb dbs 4 x 40 feet  Canoe paddle x 20   Bent over rows 5 lb 2 x 10 , cues for shoulder rectraction at end range   Wall slide flexion with forward step x 10     Supine ER sustained stretch w/ UE at 90/90  1 min hold x 5 reps   UBE 6 min 50 RPM alternating directions every 1 min--not today   Not done today  Supine press 2lbs 2x10  ER corner stretch 5 x 30 sec hold ; trunk twisting  OP EDUCATION:    Goals:  Short term  1. Reduce pain to 0-2/10 level with AROM to end range  2. Anti -gravity 3/5 strength shoulder all directions  3. Increase  shoulder flexion and abduction to AROM to   140 deg   4 Increase  shoulder ER to 40 deg.   5 . independent Home exercise program   Long term  R shoulder ER to 60 deg flex and abduction to 160 deg  R shoulder strength to at 4/ all directions   Improve outcome score by 10 points  Adry Angel will be able to carry 10 lbs at side and place 5 lbs over head x 5 reps

## 2025-03-06 NOTE — PROGRESS NOTES
Physical Therapy    Physical Therapy Treatment    Patient Name: Adry Angel  MRN: [id number]  Today's Date:3/7/2025    Time Entry:   Time Calculation  Start Time: 0900  Stop Time: 0945  Time Calculation (min): 45 min     PT Therapeutic Procedures Time Entry  Manual Therapy Time Entry: 15  Therapeutic Exercise Time Entry: 30         Insurance : [place]   Authorization /Certification / Visits allowed: 100   Visit 8      Assessment:  Adry Angel demonstrated improved AAROM ER to 60 deg with passive stretching avoiding pain .      Plan:  Continue to progress load and stretch intensity per healing phase 3   Current Problem    Problem List Items Addressed This Visit             ICD-10-CM    Glenoid labrum tear, right, initial encounter S43.431A    Instability of right shoulder joint - Primary M25.311    Synovitis of right shoulder M65.911    Orthopedic aftercare Z47.89         Subjective    Adry Angel feels that she is progressing on schedule      Pain  None , tight     Objective     PROM ER 0-20 initial, 0-60 after manual and passive ER stretching   Treatments:  Manual :   STM pecs, subscap, rhomboids, R levator   PROM scap w/ tilts , rocking   Passive stretch into elevation with emphasis on ER 2x 30 sec  PNF D1 And D2 x 10 each  Therapeutic exercises:   90/90 ER prolonged passive stretch 1# x 2 min x 2   Tball rollout 30 sec each direction fwd, CW, and CCW w/ HP   Scapula depression with red tband pull  ( hike) 2 x 10  Standing shoulder extension 1 arm ylw tband x 10  Standing push with a plus ylw x 10  Farmer's carry (2)10 lb dbs 4 x 40 feet  Resisted pull downs YTB 2 x 10 reps   Canoe   paddle x 20   Bent over rows 5 lb 2 x 10 , cues for shoulder rectraction at end range   Wall slide flexion with forward step x 10  UBE 6 min 50 RPM alternating directions every 1 min--not today   Not done today  Supine press 2lbs 2x10  ER corner stretch 5 x 30 sec hold ; trunk twisting  OP EDUCATION:    Goals:  Short term  1. Reduce  pain to 0-2/10 level with AROM to end range  2. Anti -gravity 3/5 strength shoulder all directions  3. Increase  shoulder flexion and abduction to AROM to   140 deg   4 Increase  shoulder ER to 40 deg.   5 . independent Home exercise program   Long term  R shoulder ER to 60 deg flex and abduction to 160 deg  R shoulder strength to at 4/ all directions   Improve outcome score by 10 points  Adry Angel will be able to carry 10 lbs at side and place 5 lbs over head x 5 reps

## 2025-03-07 ENCOUNTER — TREATMENT (OUTPATIENT)
Dept: PHYSICAL THERAPY | Facility: CLINIC | Age: 25
End: 2025-03-07
Payer: COMMERCIAL

## 2025-03-07 DIAGNOSIS — Z47.89 ORTHOPEDIC AFTERCARE: ICD-10-CM

## 2025-03-07 DIAGNOSIS — M65.911 SYNOVITIS OF RIGHT SHOULDER: ICD-10-CM

## 2025-03-07 DIAGNOSIS — M25.311 INSTABILITY OF RIGHT SHOULDER JOINT: Primary | ICD-10-CM

## 2025-03-07 DIAGNOSIS — S43.431A GLENOID LABRUM TEAR, RIGHT, INITIAL ENCOUNTER: ICD-10-CM

## 2025-03-07 PROCEDURE — 97110 THERAPEUTIC EXERCISES: CPT | Mod: GP,CQ | Performed by: PHYSICAL THERAPY ASSISTANT

## 2025-03-07 PROCEDURE — 97140 MANUAL THERAPY 1/> REGIONS: CPT | Mod: GP,CQ | Performed by: PHYSICAL THERAPY ASSISTANT

## 2025-03-14 ENCOUNTER — APPOINTMENT (OUTPATIENT)
Dept: PHYSICAL THERAPY | Facility: CLINIC | Age: 25
End: 2025-03-14
Payer: COMMERCIAL

## 2025-03-17 ENCOUNTER — TREATMENT (OUTPATIENT)
Dept: PHYSICAL THERAPY | Facility: CLINIC | Age: 25
End: 2025-03-17
Payer: COMMERCIAL

## 2025-03-17 DIAGNOSIS — Z47.89 ORTHOPEDIC AFTERCARE: ICD-10-CM

## 2025-03-17 DIAGNOSIS — M65.911 SYNOVITIS OF RIGHT SHOULDER: ICD-10-CM

## 2025-03-17 DIAGNOSIS — M25.311 INSTABILITY OF RIGHT SHOULDER JOINT: Primary | ICD-10-CM

## 2025-03-17 DIAGNOSIS — S43.431A GLENOID LABRUM TEAR, RIGHT, INITIAL ENCOUNTER: ICD-10-CM

## 2025-03-17 PROCEDURE — 97110 THERAPEUTIC EXERCISES: CPT | Mod: GP,CQ | Performed by: PHYSICAL THERAPY ASSISTANT

## 2025-03-17 PROCEDURE — 97140 MANUAL THERAPY 1/> REGIONS: CPT | Mod: GP,CQ | Performed by: PHYSICAL THERAPY ASSISTANT

## 2025-03-17 NOTE — PROGRESS NOTES
"Physical Therapy    Physical Therapy Treatment    Patient Name: Adry Angel  MRN: [id number]  Today's Date:3/17/2025    Time Entry:   Time Calculation  Start Time: 1425  Stop Time: 1515  Time Calculation (min): 50 min     PT Therapeutic Procedures Time Entry  Manual Therapy Time Entry: 15  Therapeutic Exercise Time Entry: 35         Insurance : [place]   Authorization /Certification / Visits allowed: 100   Visit 10      Assessment:  Adry Angel able to reach 75 deg passive stretching w clinician OP w/o pain. Able to progress resistance on many exercises w/o c/o pain or fatigue. Tolerated partial BW step ups fwd and lateral w/o c/o.    Plan:  Continue to progress load and stretch intensity per healing phase 3   Current Problem    Problem List Items Addressed This Visit             ICD-10-CM    Glenoid labrum tear, right, initial encounter S43.431A    Instability of right shoulder joint - Primary M25.311    Synovitis of right shoulder M65.911    Orthopedic aftercare Z47.89         Subjective    Adry Angel felt she turned a corner w/ ER stretching, \" I can almost get my hand down to the table \" in supine prolonged stretch.   Pain  None , tight     Objective     PROM ER 0-40 initial, 0-74 after passive 90/90 stretch      Treatments:  Manual :   STM pecs, subscap, rhomboids, R levator   Contract / relax ER   Passive stretch into elevation with emphasis on ER 2x 30 sec    Therapeutic exercises:   90/90 ER prolonged passive stretch 1# x 2 min x 2   Tball rollout 30 sec each direction fwd, CW, and CCW w/ HP ; not today   Scapula depression with red tband pull  ( hike) 2 x 10  Standing shoulder extension 1 arm red tband x 10  Standing push with a plus red x 10  Farmer's carry (2)10 lb dbs 4 x 40 feet (increased elbow flexion fwd carry )   Resisted pull downs RTB  x 10 reps   Canoe  paddle x 20   Shoulder step up/step down yoga blocks on 24\" step kneeling x 20   Bent over rows 5 lb 2 x 10 , cues for shoulder rectraction at " end range   Wall slide flexion with forward step #1 x 10  UBE 6 min 50 RPM alternating directions every 1 min  Not done today  Supine press 2lbs 2x10  ER corner stretch 5 x 30 sec hold ; trunk twisting  OP EDUCATION:    Goals:  Short term  1. Reduce pain to 0-2/10 level with AROM to end range  2. Anti -gravity 3/5 strength shoulder all directions  3. Increase  shoulder flexion and abduction to AROM to   140 deg   4 Increase  shoulder ER to 40 deg.   5 . independent Home exercise program   Long term  R shoulder ER to 60 deg flex and abduction to 160 deg  R shoulder strength to at 4/ all directions   Improve outcome score by 10 points  Adry Angel will be able to carry 10 lbs at side and place 5 lbs over head x 5 reps

## 2025-03-28 ENCOUNTER — APPOINTMENT (OUTPATIENT)
Dept: PHYSICAL THERAPY | Facility: CLINIC | Age: 25
End: 2025-03-28
Payer: COMMERCIAL

## 2025-03-31 ENCOUNTER — OFFICE VISIT (OUTPATIENT)
Facility: HOSPITAL | Age: 25
End: 2025-03-31
Payer: COMMERCIAL

## 2025-03-31 VITALS
TEMPERATURE: 97.6 F | RESPIRATION RATE: 18 BRPM | HEART RATE: 81 BPM | DIASTOLIC BLOOD PRESSURE: 84 MMHG | WEIGHT: 177.6 LBS | OXYGEN SATURATION: 97 % | BODY MASS INDEX: 29.59 KG/M2 | SYSTOLIC BLOOD PRESSURE: 123 MMHG | HEIGHT: 65 IN

## 2025-03-31 DIAGNOSIS — Z78.9 USES VAGINAL CONTRACEPTIVE RING: ICD-10-CM

## 2025-03-31 DIAGNOSIS — F41.8 DEPRESSION WITH ANXIETY: ICD-10-CM

## 2025-03-31 PROCEDURE — 3008F BODY MASS INDEX DOCD: CPT

## 2025-03-31 PROCEDURE — 99213 OFFICE O/P EST LOW 20 MIN: CPT | Mod: GE

## 2025-03-31 PROCEDURE — 99213 OFFICE O/P EST LOW 20 MIN: CPT

## 2025-03-31 RX ORDER — ETONOGESTREL AND ETHINYL ESTRADIOL VAGINAL RING .015; .12 MG/D; MG/D
RING VAGINAL
Qty: 3 EACH | Refills: 12 | Status: SHIPPED | OUTPATIENT
Start: 2025-03-31

## 2025-03-31 RX ORDER — ESCITALOPRAM OXALATE 10 MG/1
10 TABLET ORAL
Qty: 90 TABLET | Refills: 3 | Status: SHIPPED | OUTPATIENT
Start: 2025-03-31

## 2025-03-31 ASSESSMENT — ENCOUNTER SYMPTOMS
LOSS OF SENSATION IN FEET: 0
DEPRESSION: 0
OCCASIONAL FEELINGS OF UNSTEADINESS: 0

## 2025-03-31 ASSESSMENT — PAIN SCALES - GENERAL: PAINLEVEL_OUTOF10: 0-NO PAIN

## 2025-03-31 NOTE — PROGRESS NOTES
"Subjective   Patient ID: Adry Angel is a 25 y.o. female who presents for Follow-up and Med Refill.    Adry Angel is a 24 yo F with PMH of Depression, R Glenoid labrum tear s/p repair (2024) who presents for follow up and medication refill.     - patient states her pharmacy would not let her  her medications that were previously ordered  -- patient is in need of Lexapro and EluRyng refill  -- patient has not been without her Lexapro and has not had any increase in symptoms    Med Refill         Review of Systems    Objective   /84 (BP Location: Right arm, Patient Position: Sitting, BP Cuff Size: Adult)   Pulse 81   Temp 36.4 °C (97.6 °F) (Temporal)   Resp 18   Ht 1.651 m (5' 5\")   Wt 80.6 kg (177 lb 9.6 oz)   SpO2 97%   BMI 29.55 kg/m²     Physical Exam  Constitutional:       General: She is not in acute distress.     Appearance: Normal appearance. She is not ill-appearing.   Eyes:      Extraocular Movements: Extraocular movements intact.      Conjunctiva/sclera: Conjunctivae normal.   Cardiovascular:      Rate and Rhythm: Normal rate and regular rhythm.      Pulses: Normal pulses.      Heart sounds: Normal heart sounds. No murmur heard.     No gallop.   Pulmonary:      Effort: Pulmonary effort is normal.      Breath sounds: Normal breath sounds. No stridor. No wheezing.   Abdominal:      General: Abdomen is flat. Bowel sounds are normal. There is no distension.      Palpations: Abdomen is soft.      Tenderness: There is no abdominal tenderness.   Skin:     General: Skin is warm and dry.      Coloration: Skin is not jaundiced or pale.   Neurological:      Mental Status: She is alert.   Psychiatric:         Mood and Affect: Mood normal.         Behavior: Behavior normal.         Assessment/Plan   Problem List Items Addressed This Visit       Depression with anxiety    Relevant Medications    escitalopram (Lexapro) 10 mg tablet    Uses vaginal contraceptive ring    Relevant Medications    " etonogestreL-ethinyl estradioL (EluRyng) 0.12-0.015 mg/24 hr vaginal ring     Adry Angel is a 24 yo F with PMH of Depression, R Glenoid labrum tear s/p repair (2024) who presents for follow up and medication refill.     #Medication Refill  - patient states her pharmacy would not let her  her medications that were previously ordered  -- patient is in need of Lexapro and EluRyng refill  -- patient has not been without her Lexapro and has not had any increase in symptoms  - No SI/HI  - no complaints or noted side effects  - Medications refilled as above    RTC for yearly exam    Patient discussed with attending physician Dr. Garza  Plan preliminary until cosigned by attending physician.    Demetri Bains MD  Family Medicine  PGY-2

## 2025-04-04 NOTE — PROGRESS NOTES
"Physical Therapy    Physical Therapy Treatment    Patient Name: Adry Angel  MRN: [id number]  Today's Date:3/17/2025    Time Entry:                   Insurance : [employer]   Authorization /Certification / Visits allowed: 100   Visit 10      Assessment:  Adry Angel able to reach 75 deg passive stretching w clinician OP w/o pain. Able to progress resistance on many exercises w/o c/o pain or fatigue. Tolerated partial BW step ups fwd and lateral w/o c/o.    Plan:  Continue to progress load and stretch intensity per healing phase 3   Current Problem    Problem List Items Addressed This Visit             ICD-10-CM    Instability of right shoulder joint M25.311    Orthopedic aftercare - Primary Z47.89         Subjective    Adry Angel felt she turned a corner w/ ER stretching, \" I can almost get my hand down to the table \" in supine prolonged stretch.   Pain  None , tight     Objective     PROM ER 0-40 initial, 0-74 after passive 90/90 stretch      Treatments:  Manual :   STM pecs, subscap, rhomboids, R levator   Contract / relax ER   Passive stretch into elevation with emphasis on ER 2x 30 sec    Therapeutic exercises:   90/90 ER prolonged passive stretch 1# x 2 min x 2   Tball rollout 30 sec each direction fwd, CW, and CCW w/ HP ; not today   Scapula depression with red tband pull  ( hike) 2 x 10  Standing shoulder extension 1 arm red tband x 10  Standing push with a plus red x 10  Farmer's carry (2)10 lb dbs 4 x 40 feet (increased elbow flexion fwd carry )   Resisted pull downs RTB  x 10 reps   Canoe  paddle x 20   Shoulder step up/step down yoga blocks on 24\" step kneeling x 20   Bent over rows 5 lb 2 x 10 , cues for shoulder rectraction at end range   Wall slide flexion with forward step #1 x 10  UBE 6 min 50 RPM alternating directions every 1 min  Not done today  Supine press 2lbs 2x10  ER corner stretch 5 x 30 sec hold ; trunk twisting  OP EDUCATION:    Goals:  Short term  1. Reduce pain to 0-2/10 level with AROM to " end range  2. Anti -gravity 3/5 strength shoulder all directions  3. Increase  shoulder flexion and abduction to AROM to   140 deg   4 Increase  shoulder ER to 40 deg.   5 . independent Home exercise program   Long term  R shoulder ER to 60 deg flex and abduction to 160 deg  R shoulder strength to at 4/ all directions   Improve outcome score by 10 points  Adry Angel will be able to carry 10 lbs at side and place 5 lbs over head x 5 reps

## 2025-04-07 ENCOUNTER — APPOINTMENT (OUTPATIENT)
Dept: PHYSICAL THERAPY | Facility: CLINIC | Age: 25
End: 2025-04-07
Payer: COMMERCIAL

## 2025-04-10 ENCOUNTER — APPOINTMENT (OUTPATIENT)
Dept: ORTHOPEDIC SURGERY | Facility: HOSPITAL | Age: 25
End: 2025-04-10
Payer: COMMERCIAL

## 2025-04-18 ENCOUNTER — APPOINTMENT (OUTPATIENT)
Dept: ORTHOPEDIC SURGERY | Facility: HOSPITAL | Age: 25
End: 2025-04-18
Payer: COMMERCIAL

## 2025-04-18 ENCOUNTER — OFFICE VISIT (OUTPATIENT)
Dept: ORTHOPEDIC SURGERY | Facility: HOSPITAL | Age: 25
End: 2025-04-18
Payer: COMMERCIAL

## 2025-04-18 DIAGNOSIS — M25.311 INSTABILITY OF RIGHT SHOULDER JOINT: Primary | ICD-10-CM

## 2025-04-18 PROCEDURE — 1036F TOBACCO NON-USER: CPT

## 2025-04-18 PROCEDURE — 99212 OFFICE O/P EST SF 10 MIN: CPT

## 2025-04-18 ASSESSMENT — PAIN - FUNCTIONAL ASSESSMENT: PAIN_FUNCTIONAL_ASSESSMENT: NO/DENIES PAIN

## 2025-04-18 NOTE — PROGRESS NOTES
PRIMARY CARE PHYSICIAN: Demetri Bains MD    ORTHOPAEDIC POSTOPERATIVE VISIT    ASSESSMENT & PLAN    Impression: 25 y.o. female 5 months postop s/p Right shoulder arthroscopy, anterior stabilization, labral repair, capsulorrhaphy, intra-articular debridement and synovectomy done 11/27/24.    Plan:   Overall Adry murray is doing great.  She has progressed well with physical therapy and has returned to all her normal daily activities without difficulty.  She will continue to work on her exercises, progressing her strengthening and activities as she tolerates.  We discussed her postop precautions and she expressed understanding.  She will ice and rest shoulder as she needs.  She will follow-up with us as needed.    I reviewed their postoperative timeline and plan with them. They understand the postoperative precautions and the treatment plan going forward.     Follow-Up: Patient will follow-up as needed    At the end of the visit, all questions were answered in full. The patient is in agreement with the plan and recommendations. They will call the office with any questions/concerns.      Note dictated with [email or web address] software. Completed without full typed error editing and sent to avoid delay.      SUBJECTIVE  CHIEF COMPLAINT: Postop right shoulder arthroscopic stabilization    HPI: Adry Angel is a 25 y.o. patient who is now 5 months postop s/p Right shoulder arthroscopy, anterior stabilization, labral repair, capsulorrhaphy, intra-articular debridement and synovectomy done 11/27/24. Overall patient is doing well. She has no pain and feels she is ready to discontinue with formal physical therapy. She reports she has returned to all her normal daily activities. No concerns at this time.     REVIEW OF SYSTEMS  Constitutional: See HPI for pain assessment, No significant weight loss, recent trauma  Cardiovascular: No chest pain, shortness of breath  Respiratory: No difficulty breathing,  "cough  Gastrointestinal: No nausea, vomiting, diarrhea, constipation  Musculoskeletal: Noted in HPI, positive for pain, restricted motion, stiffness  Integumentary: No rashes, easy bruising, redness   Neurological: no numbness or tingling in extremities, no gait disturbances   Psychiatric: No mood changes, memory changes, social issues  Heme/Lymph: no excessive swelling, easy bruising, excessive bleeding  ENT: No hearing changes  Eyes: No vision changes    CURRENT MEDICATIONS:   Current Outpatient Medications   Medication Sig Dispense Refill    escitalopram (Lexapro) 10 mg tablet Take 1 tablet (10 mg) by mouth early in the morning.. 90 tablet 3    etonogestreL-ethinyl estradioL (EluRyng) 0.12-0.015 mg/24 hr vaginal ring Insert vaginally and leave in place for 3 consecutive weeks, then remove for 1 week. 3 each 12     No current facility-administered medications for this visit.        OBJECTIVE    PHYSICAL EXAM      11/27/2024     9:18 AM 11/27/2024     9:30 AM 12/3/2024     3:05 PM 12/12/2024    12:40 PM 1/9/2025     1:16 PM 2/20/2025     4:26 PM 3/31/2025     3:18 PM   Vitals   Systolic 125 139 121   131 123   Diastolic 83 85 77   83 84   BP Location      Right arm Right arm   Heart Rate 85 91 92   74 81   Temp  36.7 °C (98.1 °F)    36.3 °C (97.3 °F) 36.4 °C (97.6 °F)   Resp 16 16     18   Height    1.651 m (5' 5\") 1.651 m (5' 5\") 1.651 m (5' 5\") 1.651 m (5' 5\")   Weight (lb)   155 155 160 175.8 177.6   BMI   25.79 kg/m2 25.79 kg/m2 26.63 kg/m2 29.25 kg/m2 29.55 kg/m2   BSA (m2)   1.8 m2 1.8 m2 1.82 m2 1.91 m2 1.92 m2   Visit Report   Report Report Report Report Report      There is no height or weight on file to calculate BMI.    General: Well-appearing, no acute distress    Skin intact bilateral upper and lower extremities  No erythema  No warmth    Detailed examination of the right shoulder demonstrates:  Surgical incision(s) well-healed  No erythema or warmth  No ecchymosis or soft tissue swelling  Biceps " contour normal  Shoulder range of motion: Forward flexion to 140, ER to 30  No apprehension  Good resistance with Jobes, ER and belly press   Stable to anterior posterior load-and-shift  Upper extremity motor grossly intact  C5-T1 sensation intact bilaterally  2+ radial pulses bilaterally  Warm and well-perfused, brisk capillary refill    IMAGING:   No new imaging

## 2025-05-08 ENCOUNTER — APPOINTMENT (OUTPATIENT)
Facility: HOSPITAL | Age: 25
End: 2025-05-08
Payer: COMMERCIAL

## 2025-05-19 ENCOUNTER — DOCUMENTATION (OUTPATIENT)
Dept: PHYSICAL THERAPY | Facility: CLINIC | Age: 25
End: 2025-05-19
Payer: COMMERCIAL

## 2025-10-17 ENCOUNTER — APPOINTMENT (OUTPATIENT)
Facility: HOSPITAL | Age: 25
End: 2025-10-17
Payer: COMMERCIAL

## (undated) DEVICE — TUBING, SUCTION, 6MM X 10, CLEAN N-COND

## (undated) DEVICE — DRESSING, TRANSPARENT, TEGADERM, FRAME STYLE, 4 X 4.5, STRL

## (undated) DEVICE — SUTURELASSO, 45 DEGREE CRV RIGHT

## (undated) DEVICE — GLOVE, SURGICAL, PROTEXIS PI , 8.0, PF, LF

## (undated) DEVICE — PROBE, APOLLO RF, 90 DEG, EXTRA LARTGE

## (undated) DEVICE — GLOVE, PROTEXIS PI CLASSIC, SZ-6.5, PF, LF

## (undated) DEVICE — BLANKET, LOWER BODY, VHA PLUS, ADULT

## (undated) DEVICE — STRIP, SKIN CLOSURE, STERI STRIP, REINFORCED, 0.5 X 4 IN

## (undated) DEVICE — SUTURE, VICRYL, 2-0, 27 IN, SH, UNDYED

## (undated) DEVICE — KIT, HIP, FOR 1.8MM Q-FIX IMP, DISP

## (undated) DEVICE — BONECUTTER, COOLCUT TORPEDO, 4.0MM X 13CM

## (undated) DEVICE — PENCIL, ELECTROSURG, W/BUTTON SWITCH & HOLSTER, EZ CLEAN, DISP

## (undated) DEVICE — GLOVE, SURGICAL, PROTEXIS PI BLUE W/NEUTHERA, 6.5, PF, LF

## (undated) DEVICE — SUTURE, MONOCRYL, 3-0, 27 IN, PS-2, UNDYED

## (undated) DEVICE — SLEEVE, STAR, VELCRO

## (undated) DEVICE — Device

## (undated) DEVICE — GLOVE, SURGICAL, PROTEXIS PI , 7.5, PF, LF

## (undated) DEVICE — GOWN, SURGICAL, SIRUS, NON REINFORCED, LARGE

## (undated) DEVICE — CANNULA, TRIPLE-DAM TWIST-IN, 7MM X 7CM, VALVED

## (undated) DEVICE — TUBING, PATIENT 8FT STERILE

## (undated) DEVICE — DRESSING, GAUZE, SPONGE, 16 PLY, CURITY, 4 X 4 IN, BULK, NS